# Patient Record
Sex: FEMALE | Race: WHITE | HISPANIC OR LATINO | Employment: FULL TIME | URBAN - METROPOLITAN AREA
[De-identification: names, ages, dates, MRNs, and addresses within clinical notes are randomized per-mention and may not be internally consistent; named-entity substitution may affect disease eponyms.]

---

## 2021-06-01 ENCOUNTER — OCCUPATIONAL MEDICINE (OUTPATIENT)
Dept: URGENT CARE | Facility: PHYSICIAN GROUP | Age: 26
End: 2021-06-01
Payer: COMMERCIAL

## 2021-06-01 VITALS
WEIGHT: 130 LBS | DIASTOLIC BLOOD PRESSURE: 64 MMHG | TEMPERATURE: 98 F | BODY MASS INDEX: 23.04 KG/M2 | HEIGHT: 63 IN | RESPIRATION RATE: 16 BRPM | SYSTOLIC BLOOD PRESSURE: 110 MMHG | HEART RATE: 66 BPM | OXYGEN SATURATION: 99 %

## 2021-06-01 DIAGNOSIS — R20.0 FINGER NUMBNESS: ICD-10-CM

## 2021-06-01 DIAGNOSIS — Z02.1 PRE-EMPLOYMENT DRUG SCREENING: ICD-10-CM

## 2021-06-01 DIAGNOSIS — S11.93XA: ICD-10-CM

## 2021-06-01 DIAGNOSIS — R29.898 HAND WEAKNESS: ICD-10-CM

## 2021-06-01 LAB
BREATH ALCOHOL COMMENT: NORMAL
POC BREATHALIZER: 0 PERCENT (ref 0–0.01)

## 2021-06-01 PROCEDURE — 82075 ASSAY OF BREATH ETHANOL: CPT | Performed by: NURSE PRACTITIONER

## 2021-06-01 PROCEDURE — 99000 SPECIMEN HANDLING OFFICE-LAB: CPT | Performed by: NURSE PRACTITIONER

## 2021-06-01 PROCEDURE — 36415 COLL VENOUS BLD VENIPUNCTURE: CPT | Performed by: NURSE PRACTITIONER

## 2021-06-01 PROCEDURE — 80305 DRUG TEST PRSMV DIR OPT OBS: CPT | Performed by: NURSE PRACTITIONER

## 2021-06-01 PROCEDURE — 99203 OFFICE O/P NEW LOW 30 MIN: CPT | Performed by: NURSE PRACTITIONER

## 2021-06-01 PROCEDURE — 80307 DRUG TEST PRSMV CHEM ANLYZR: CPT | Performed by: NURSE PRACTITIONER

## 2021-06-01 RX ORDER — METHYLPREDNISOLONE 4 MG/1
TABLET ORAL
Qty: 1 EACH | Refills: 0 | Status: SHIPPED | OUTPATIENT
Start: 2021-06-01 | End: 2021-06-01 | Stop reason: SDUPTHER

## 2021-06-01 RX ORDER — METHYLPREDNISOLONE 4 MG/1
TABLET ORAL
Qty: 1 EACH | Refills: 0 | Status: SHIPPED | OUTPATIENT
Start: 2021-06-01 | End: 2021-06-12

## 2021-06-01 ASSESSMENT — ENCOUNTER SYMPTOMS
CONSTITUTIONAL NEGATIVE: 1
CHILLS: 0
SENSORY CHANGE: 1
FEVER: 0

## 2021-06-01 ASSESSMENT — VISUAL ACUITY: OU: 1

## 2021-06-01 NOTE — LETTER
"EMPLOYEE’S CLAIM FOR COMPENSATION/ REPORT OF INITIAL TREATMENT  FORM C-4    EMPLOYEE’S CLAIM - PROVIDE ALL INFORMATION REQUESTED   First Name  Shahana Last Name  Deirdre Birthdate                    1995                Sex  female Claim Number   Home Address  [address] Age  25 y.o. 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 (if applicable)  I was working on the table    How did this injury or occupational disease occur? (Be specific an answer in detail. Use additional sheet if necessary)  I was building a truss at the table from the floor and the lead was working on top of the table with the staple gun and a staple flew in my neck going in about 1/4 inch into my skin   If you believe that you have an occupational disease, when did you first have knowledge of the disability and it relationship to your employment?  N/A Witnesses to the Accident  Becca Montesinos      Nature of Injury or Occupational Disease  Foreign Body  Part(s) of Body Injured or Affected  Soft Tissue - Neck, Hand (L), Hand (R)    I certify that the " above is true and correct to the best of my knowledge and that I have provided this information in order to obtain the benefits of [place]  I hereby authorize any physician, chiropractor, surgeon, practitioner, or other person, any hospital, including [facility] or [facility], any medical service organization, any insurance company, or other institution or organization to release to each other, any medical or other information, including benefits paid or payable, pertinent to this injury or disease, except information relative to diagnosis, treatment and/or counseling for AIDS, psychological conditions, alcohol or controlled substances, for which I must give specific authorization.  A Photostat of this authorization shall be as valid as the original.     Date 06/01/2021   Place [facility]   Employee’s Signature   THIS REPORT MUST BE COMPLETED AND MAILED WITHIN 3 WORKING DAYS OF TREATMENT   Place  [facility]  Name of Facility  [facility]   Date  6/1/2021 Diagnosis  (S11.93XA) Puncture wound of neck, initial encounter  (R20.0) Finger numbness  (R29.898) Hand weakness Is there evidence the injured employee was under the              influence of alcohol and/or another controlled substance at the time of accident?   Hour  10:43 AM Description of Injury or Disease  Diagnoses of Puncture wound of neck, initial encounter, Finger numbness, and Hand weakness were pertinent to this visit. No   Treatment  Initial visit. Suspect cubital tunnel syndrome. Rx sent for short course of steroids. Work restrictions. Follow up in 3 days. Return sooner if symptoms change/worsen.  Have you advised the patient to remain off work five days or     more? No   X-Ray Findings      If Yes   From Date  To Date      From information given by the employee, together with medical evidence, can you  "directly connect this injury or occupational disease as job incurred?  Yes If No Full Duty    No Modified Duty  Yes   Is additional medical care by a physician indicated?  Yes If Modified Duty, Specify any Limitations / Restrictions  Per D39   Do you know of any previous injury or disease contributing to this condition or occupational disease?                            No   Date  6/1/2021 Print Doctor’s Name   KAILYN Gross I certify the employer’s copy of  this form was mailed on:   Address  [address] [place] [address]. [address] Insurer’s Use Only     [facility] Zip  [address]    Provider’s Tax ID Number  [id number] Telephone  Dept: [phone]      sandrita-MAGGIE Young  Signature:     Degree          ORIGINAL-TREATING PHYSICIAN OR CHIROPRACTOR    PAGE 2-INSURER/TPA    PAGE 3-EMPLOYER    PAGE 4-EMPLOYEE        Form C-4 (rev.10/07)           BRIEF DESCRIPTION OF RIGHTS AND BENEFITS  (Pursuant to [place] 616C.050)    Notice of Injury or Occupational Disease (Incident Report Form C-1): If an injury or occupational disease (OD) arises out of and in the course of employment, you must provide written notice to your employer as soon as practicable, but no later than 7 days after the accident or OD. Your employer shall maintain a sufficient supply of the required forms.    Claim for Compensation (Form C-4): If medical treatment is sought, the form C-4 is available at the place of initial treatment. A completed \"Claim for Compensation\" (Form C-4) must be filed within 90 days after an accident or OD. The treating physician or chiropractor must, within 3 working days after treatment, complete and mail to the employer, the employer's insurer and third-party , the Claim for Compensation.    Medical Treatment: If you require medical treatment for your on-the-job injury or OD, you may be required to select a physician or chiropractor from a list provided by your workers’ compensation " insurer, if it has contracted with an Organization for Managed Care (MCO) or Preferred Provider Organization (PPO) or providers of health care. If your employer has not entered into a contract with an MCO or PPO, you may select a physician or chiropractor from the Panel of Physicians and Chiropractors. Any medical costs related to your industrial injury or OD will be paid by your insurer.    Temporary Total Disability (TTD): If your doctor has certified that you are unable to work for a period of at least 5 consecutive days, or 5 cumulative days in a 20-day period, or places restrictions on you that your employer does not accommodate, you may be entitled to TTD compensation.    Temporary Partial Disability (TPD): If the wage you receive upon reemployment is less than the compensation for TTD to which you are entitled, the insurer may be required to pay you TPD compensation to make up the difference. TPD can only be paid for a maximum of 24 months.    Permanent Partial Disability (PPD): When your medical condition is stable and there is an indication of a PPD as a result of your injury or OD, within 30 days, your insurer must arrange for an evaluation by a rating physician or chiropractor to determine the degree of your PPD. The amount of your PPD award depends on the date of injury, the results of the PPD evaluation, your age and wage.    Permanent Total Disability (PTD): If you are medically certified by a treating physician or chiropractor as permanently and totally disabled and have been granted a PTD status by your insurer, you are entitled to receive monthly benefits not to exceed 66 2/3% of your average monthly wage. The amount of your PTD payments is subject to reduction if you previously received a lump-sum PPD award.    Vocational Rehabilitation Services: You may be eligible for vocational rehabilitation services if you are unable to return to the job due to a permanent physical impairment or permanent  restrictions as a result of your injury or occupational disease.    Transportation and Per Shawn Reimbursement: You may be eligible for travel expenses and per shawn associated with medical treatment.    Reopening: You may be able to reopen your claim if your condition worsens after claim closure.     Appeal Process: If you disagree with a written determination issued by the insurer or the insurer does not respond to your request, you may appeal to the Department of Administration, , by following the instructions contained in your determination letter. You must appeal the determination within 70 days from the date of the determination letter at [address], [address], [place], [place] [address], or [address], [address], [place], [place] [address]. If you disagree with the  decision, you may appeal to the Department of Administration, . You must file your appeal within 30 days from the date of the  decision letter at [address], [address], [place], [place] [address], or [address], [address], [place], [place] [address]. If you disagree with a decision of an , you may file a petition for judicial review with the District Court. You must do so within 30 days of the Appeal Officer’s decision. You may be represented by an  at your own expense or you may contact the [facility] for possible representation.    [place]  for Injured Workers (NAIW): If you disagree with a  decision, you may request that NAIW represent you without charge at an  Hearing. For information regarding denial of benefits, you may contact the [facility] at: [address], [address] [address], [phone]) [phone], or [address], [address] [address], [phone]    To File a Complaint with the Division: If you wish to file a complaint with the  of the Division  of Industrial Relations (DIR),  please contact the Workers’ Compensation Section, [address], [address], [place], [place] [address], telephone (399) [phone], or [address], [address], [place], [place] [address], telephone [phone]) [phone].    For assistance with Workers’ Compensation Issues: You may contact the [facility] Office for Consumer Health Assistance, [address], [address], [place], [place] [address], Toll Free 1-413.167.4737, Web site: http://[facility].nv.gov/Programs/LIBRADO E-mail: librado@[facility].nv.gov              __________________________________________________________________                                    _________________            Employee Name / Signature                                                                                                                            Date                                                                                                                                                                                                                              D-2 (rev. 10/20)

## 2021-06-01 NOTE — LETTER
[facility]  [address] - [place] [address]  Phone:  [phone] - Fax:  [phone]   [facility] Progress Report and Disability Certification  Date of Service: 6/1/2021   No Show:  No  Date / Time of Next Visit: 6/4/2021 @9:20AM   Claim Information   Patient Name: Shahana Gilmore  Claim Number:     Employer: [employer]  Date of Injury: 5/30/2021     Insurer / TPA: [employer]  ID / SSN:     Occupation: Builder  Diagnosis: Diagnoses of Puncture wound of neck, initial encounter, Finger numbness, and Hand weakness were pertinent to this visit.    Medical Information   Related to Industrial Injury? Yes    Subjective Complaints:  DOI 5/30/2021 @ 2:00 PM.  Initial visit.  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  Reports that a staple flew into the right side of her neck.  Reports she was able to remove this completely.  Reports experiencing neck pain.  Was not moving her neck for a while.  Neck pain has improved.  Afterwards, started to notice some numbness at the tips of her last 4 fingers and on both sides.  Feels weak.  Denies hand pain.  Has been back to work, but notes that it is difficult to  the hammer due to the numbness and weakness in her hands.  Right hand dominant.  Patient reports her last tetanus shot was received in 2013.  Denies fever.  Denies bowel/bladder incontinence or sensory changes/weakness elsewhere.  Denies previous injury or similar symptoms.  Denies second job.  Has worked her current job for 2 years which involves repetitive use of her hands.   Objective Findings: Constitutional:       General: She is not in acute distress.     Appearance: She is well-developed. She is not ill-appearing or toxic-appearing.   Musculoskeletal:         General: No deformity. Normal range of motion.      Right hand: No  swelling, deformity, lacerations or tenderness. Normal range of motion. Decreased strength. Decreased sensation of the ulnar distribution and median distribution. Normal capillary refill.      Left hand: No swelling, deformity, lacerations or tenderness. Normal range of motion. Decreased strength. Decreased sensation of the ulnar distribution and median distribution. Normal capillary refill.      Cervical back: Normal range of motion. Signs of trauma (Two small, healing puncture wounds from staple at right lateral aspect of neck, minimal swelling, TTP, no surrounding erythema, edema, warmth, or discharge) present. No swelling, deformity, erythema, tenderness or bony tenderness. Normal range of motion.      Comments: Positive left Tinel's sign; negative Phalen sign   Skin:     General: Skin is warm and dry.      Capillary Refill: Capillary refill takes less than 2 seconds.      Coloration: Skin is not pale.      Findings: No bruising.   Neurological:      Mental Status: She is alert and oriented to person, place, and time.      GCS: GCS eye subscore is 4. GCS verbal subscore is 5. GCS motor subscore is 6.      Coordination: Coordination normal.   Pre-Existing Condition(s):     Assessment:   Initial Visit    Status: Additional Care Required  Permanent Disability:No    Plan: Medication    Diagnostics:      Comments:  Initial visit. Puncture wound of neck healing well. Suspect cubital/carpal tunnel syndrome. Rx sent for short course of steroids. Work restrictions. Follow up in 3 days. Return sooner if symptoms change/worsen.    Disability Information   Status: Released to Restricted Duty    From:  6/1/2021  Through: 6/4/2021 Restrictions are: Temporary   Physical Restrictions   Sitting:    Standing:    Stooping:    Bending:      Squatting:    Walking:    Climbing:    Pushing:      Pulling:    Other:    Reaching Above Shoulder (L):   Reaching Above Shoulder (R):       Reaching Below Shoulder (L):    Reaching Below  Shoulder (R):      Not to exceed Weight Limits   Carrying(hrs):   Weight Limit(lb):   Lifting(hrs):   Weight  Limit(lb):     Comments:      Repetitive Actions   Hands: i.e. Fine Manipulations from Grasping: < or = to 1 hr/day   Feet: i.e. Operating Foot Controls:     Driving / Operate Machinery:     Provider Name:   KAILYN Gross Physician Signature:  Physician Name:     Clinic Name / Location: [address]  [place] [address] Clinic Phone Number: Dept: [phone]   Appointment Time: 10:00 Am Visit Start Time: 10:43 AM   Check-In Time:  10:18 Am Visit Discharge Time: 11:50 AM   Original-Treating Physician or Chiropractor    Page 2-Insurer/TPA    Page 3-Employer    Page 4-Employee

## 2021-06-01 NOTE — LETTER
[facility] [place] [facility]  [address] - [place] [address]  Phone:  [phone] - Fax:  [phone]   [facility] Progress Report and Disability Certification  Date of Service: 6/1/2021   No Show:  No  Date / Time of Next Visit: 6/4/2021   Claim Information   Patient Name: Shahana Gilmore  Claim Number:     Employer: [employer] *** Date of Injury: 5/30/2021     Insurer / TPA: [employer] *** ID / SSN:     Occupation: Builder *** Diagnosis: Diagnoses of Puncture wound of neck, initial encounter, Finger numbness, and Hand weakness were pertinent to this visit.    Medical Information   Related to Industrial Injury? Yes ***   Subjective Complaints:  DOI 5/30/2021 @ 2:00 PM.  Initial visit.  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  Reports that a staple flew into the right side of her neck.  Reports she was able to remove this completely.  Reports experiencing neck pain.  Was not moving her neck for a while.  Neck pain has improved.  Afterwards, started to notice some numbness at the tips of her last 4 fingers and on both sides.  Feels weak.  Denies hand pain.  Has been back to work, but notes that it is difficult to  the hammer due to the numbness and weakness in her hands.  Right hand dominant.  Patient reports her last tetanus shot was received in 2013.  Denies fever.  Denies bowel/bladder incontinence or sensory changes/weakness elsewhere.  Denies previous injury or similar symptoms.  Denies second job.  Has worked her current job for 2 years which involves repetitive use of her hands.   Objective Findings: Constitutional:       General: She is not in acute distress.     Appearance: She is well-developed. She is not ill-appearing or toxic-appearing.   Musculoskeletal:         General: No deformity. Normal range of motion.      Right hand: No  swelling, deformity, lacerations or tenderness. Normal range of motion. Decreased strength. Decreased sensation of the ulnar distribution and median distribution. Normal capillary refill.      Left hand: No swelling, deformity, lacerations or tenderness. Normal range of motion. Decreased strength. Decreased sensation of the ulnar distribution and median distribution. Normal capillary refill.      Cervical back: Normal range of motion. Signs of trauma (Two small, healing puncture wounds from staple at right lateral aspect of neck, minimal swelling, TTP, no surrounding erythema, edema, warmth, or discharge) present. No swelling, deformity, erythema, tenderness or bony tenderness. Normal range of motion.      Comments: Positive left Tinel's sign; negative Phalen sign   Skin:     General: Skin is warm and dry.      Capillary Refill: Capillary refill takes less than 2 seconds.      Coloration: Skin is not pale.      Findings: No bruising.   Neurological:      Mental Status: She is alert and oriented to person, place, and time.      GCS: GCS eye subscore is 4. GCS verbal subscore is 5. GCS motor subscore is 6.      Coordination: Coordination normal.   Pre-Existing Condition(s):     Assessment:   Initial Visit    Status: Additional Care Required  Permanent Disability:No    Plan: Medication    Diagnostics:      Comments:  Initial visit. Suspect cubital/carpal tunnel syndrome. Rx sent for short course of steroids. Work restrictions. Follow up in 3 days. Return sooner if symptoms change/worsen.    Disability Information   Status: Released to Restricted Duty    From:  6/1/2021  Through: 6/4/2021 Restrictions are: Temporary   Physical Restrictions   Sitting:    Standing:    Stooping:    Bending:      Squatting:    Walking:    Climbing:    Pushing:      Pulling:    Other:    Reaching Above Shoulder (L):   Reaching Above Shoulder (R):       Reaching Below Shoulder (L):    Reaching Below Shoulder (R):      Not to exceed Weight  Limits   Carrying(hrs):   Weight Limit(lb):   Lifting(hrs):   Weight  Limit(lb):     Comments:      Repetitive Actions   Hands: i.e. Fine Manipulations from Grasping: < or = to 1 hr/day   Feet: i.e. Operating Foot Controls:     Driving / Operate Machinery:     Provider Name:   KAILYN Gross Physician Signature:  Physician Name:     Clinic Name / Location: [address]  [place] [address] Clinic Phone Number: Dept: [phone]   Appointment Time: 10:00 Am Visit Start Time: 10:43 AM   Check-In Time:  10:18 Am Visit Discharge Time:  ***   Original-Treating Physician or Chiropractor    Page 2-Insurer/TPA    Page 3-Employer    Page 4-Employee

## 2021-06-01 NOTE — PROGRESS NOTES
Subjective:     Shahana Gilmore is a 25 y.o. female who presents for Neck Injury (WC, staple went in R side neck, (stape is out), fingers are numb, x4 days )    DOI 5/30/2021 @ 2:00 PM.  Initial visit.  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    Patient reports she was building a [employer] while the lead was working on top with a staple gun.  Reports that a staple flew into the right side of her neck.  Reports she was able to remove this completely.  Reports experiencing neck pain.  Was not moving her neck for a while.  Neck pain has improved.  Afterwards, started to notice some numbness at the tips of her last 4 fingers and on both sides.  Feels weak.  Denies hand pain.  Has been back to work, but notes that it is difficult to  the hammer due to the numbness and weakness in her hands.  Right hand dominant.  Patient reports her last tetanus shot was received in 2013.  Denies fever.  Denies bowel/bladder incontinence or sensory changes/weakness elsewhere.  Denies previous injury or similar symptoms.  Denies second job.  Has worked her current job for 2 years which involves repetitive use of her hands.    Patient was screened prior to rooming and denied COVID-19 diagnosis or contact with a person who has been diagnosed or is suspected to have COVID-19. During this visit, appropriate PPE was worn, hand hygiene was performed, and the patient and any visitors were masked.    PMH: No pertinent past medical history to this problem  MEDS: Medications were reviewed in Epic  ALLERGIES: Allergies were reviewed in Epic  SOCHX: Works as a builder at [employer]   FH: No pertinent family history to this problem    Review of Systems   Constitutional: Negative.  Negative for chills, fever and malaise/fatigue.   Skin:        R neck staple puncture wound   Neurological: Positive for sensory change (Finger numbness, weakness).   All other systems reviewed and are negative.    Additional details per HPI.      Objective:     /64  "(BP Location: Right arm, Patient Position: Sitting, BP Cuff Size: Adult)   Pulse 66   Temp 36.7 °C (98 °F) (Temporal)   Resp 16   Ht 1.6 m (5' 3\")   Wt 59 kg (130 lb)   SpO2 99%   BMI 23.03 kg/m²     Physical Exam  Vitals reviewed.   Constitutional:       General: She is not in acute distress.     Appearance: She is well-developed. She is not ill-appearing or toxic-appearing.   HENT:      Head: Normocephalic.      Right Ear: External ear normal.      Left Ear: External ear normal.      Nose: Nose normal.   Eyes:      General: Vision grossly intact.      Extraocular Movements: Extraocular movements intact.      Conjunctiva/sclera: Conjunctivae normal.   Cardiovascular:      Rate and Rhythm: Normal rate.      Pulses: Normal pulses.   Pulmonary:      Effort: Pulmonary effort is normal. No respiratory distress.   Musculoskeletal:         General: No deformity. Normal range of motion.      Right hand: No swelling, deformity, lacerations or tenderness. Normal range of motion. Decreased strength. Decreased sensation of the ulnar distribution and median distribution. Normal capillary refill.      Left hand: No swelling, deformity, lacerations or tenderness. Normal range of motion. Decreased strength. Decreased sensation of the ulnar distribution and median distribution. Normal capillary refill.      Cervical back: Normal range of motion. Signs of trauma (Two small, healing puncture wounds from staple at right lateral aspect of neck, minimal swelling, TTP, no surrounding erythema, edema, warmth, or discharge) present. No swelling, deformity, erythema, tenderness or bony tenderness. Normal range of motion.      Comments: Positive left Tinel's sign; negative Phalen sign   Skin:     General: Skin is warm and dry.      Capillary Refill: Capillary refill takes less than 2 seconds.      Coloration: Skin is not pale.      Findings: No bruising.   Neurological:      Mental Status: She is alert and oriented to person, place, " and time.      GCS: GCS eye subscore is 4. GCS verbal subscore is 5. GCS motor subscore is 6.      Motor: No weakness.      Coordination: Coordination normal.   Psychiatric:         Behavior: Behavior normal. Behavior is cooperative.       Assessment/Plan:     1. Puncture wound of neck, initial encounter    2. Finger numbness  - methylPREDNISolone (MEDROL DOSEPAK) 4 MG Tablet Therapy Pack; Use as directed on package. May take all of Day 1 as a single dose (24 mg) when starting.  Dispense: 1 Each; Refill: 0    3. Hand weakness  - methylPREDNISolone (MEDROL DOSEPAK) 4 MG Tablet Therapy Pack; Use as directed on package. May take all of Day 1 as a single dose (24 mg) when starting.  Dispense: 1 Each; Refill: 0    Initial visit. Puncture wound healing well.  Suspect cubital/carpal tunnel syndrome. Rx sent for short course of steroids. Work restrictions. Follow up in 3 days. Return sooner if symptoms change/worsen.    Differential diagnosis, natural history, supportive care, over-the-counter symptom management per 's instructions, close monitoring, and indications for immediate follow-up discussed.     Patient advised to: Return in about 3 days (around 6/4/2021).    All questions answered. Patient agrees with the plan of care.

## 2021-06-01 NOTE — LETTER
[facility]  [address] - [place] [address]  Phone:  [phone] - Fax:  [phone]   [facility] Progress Report and Disability Certification  Date of Service: 6/1/2021   No Show:  No  Date / Time of Next Visit: 6/4/2021   Claim Information   Patient Name: Shahana Gilmore  Claim Number:     Employer: [employer]  Date of Injury: 5/30/2021     Insurer / TPA: [employer]  ID / SSN:     Occupation: Builder  Diagnosis: Diagnoses of Puncture wound of neck, initial encounter, Finger numbness, and Hand weakness were pertinent to this visit.    Medical Information   Related to Industrial Injury? Yes    Subjective Complaints:  DOI 5/30/2021 @ 2:00 PM.  Initial visit.  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  Reports that a staple flew into the right side of her neck.  Reports she was able to remove this completely.  Reports experiencing neck pain.  Was not moving her neck for a while.  Neck pain has improved.  Afterwards, started to notice some numbness at the tips of her last 4 fingers and on both sides.  Feels weak.  Denies hand pain.  Has been back to work, but notes that it is difficult to  the hammer due to the numbness and weakness in her hands.  Right hand dominant.  Patient reports her last tetanus shot was received in 2013.  Denies fever.  Denies bowel/bladder incontinence or sensory changes/weakness elsewhere.  Denies previous injury or similar symptoms.  Denies second job.  Has worked her current job for 2 years which involves repetitive use of her hands.   Objective Findings: Constitutional:       General: She is not in acute distress.     Appearance: She is well-developed. She is not ill-appearing or toxic-appearing.   Musculoskeletal:         General: No deformity. Normal range of motion.      Right hand: No swelling,  deformity, lacerations or tenderness. Normal range of motion. Decreased strength. Decreased sensation of the ulnar distribution. Normal capillary refill.      Left hand: No swelling, deformity, lacerations or tenderness. Normal range of motion. Decreased strength. Decreased sensation of the ulnar distribution. Normal capillary refill.      Cervical back: Normal range of motion. Signs of trauma (Two small, healing puncture wounds from staple at right lateral aspect of neck, minimal swelling, TTP, no surrounding erythema, edema, warmth, or discharge) present. No swelling, deformity, erythema, tenderness or bony tenderness. Normal range of motion.      Comments: Positive left Tinel's sign; negative Phalen sign   Skin:     General: Skin is warm and dry.      Capillary Refill: Capillary refill takes less than 2 seconds.      Coloration: Skin is not pale.      Findings: No bruising.   Neurological:      Mental Status: She is alert and oriented to person, place, and time.      GCS: GCS eye subscore is 4. GCS verbal subscore is 5. GCS motor subscore is 6.      Coordination: Coordination normal.   Psychiatric:         Behavior: Behavior normal. Behavior is cooperative.    Pre-Existing Condition(s):     Assessment:   Initial Visit    Status: Additional Care Required  Permanent Disability:No    Plan: Medication    Diagnostics:      Comments:  Initial visit. Suspect cubital tunnel syndrome. Rx sent for short course of steroids. Work restrictions. Follow up in 3 days. Return sooner if symptoms change/worsen.    Disability Information   Status: Released to Restricted Duty    From:  6/1/2021  Through: 6/4/2021 Restrictions are: Temporary   Physical Restrictions   Sitting:    Standing:    Stooping:    Bending:      Squatting:    Walking:    Climbing:    Pushing:      Pulling:    Other:    Reaching Above Shoulder (L):   Reaching Above Shoulder (R):       Reaching Below Shoulder (L):    Reaching Below Shoulder (R):      Not to exceed  Weight Limits   Carrying(hrs):   Weight Limit(lb):   Lifting(hrs):   Weight  Limit(lb):     Comments:      Repetitive Actions   Hands: i.e. Fine Manipulations from Grasping: < or = to 1 hr/day   Feet: i.e. Operating Foot Controls:     Driving / Operate Machinery:     Provider Name:   KAILYN Gross Physician Signature:  Physician Name:     Clinic Name / Location: [address]  [place] [address] Clinic Phone Number: Dept: [phone]   Appointment Time: 10:00 Am Visit Start Time: 10:43 AM   Check-In Time:  10:18 Am Visit Discharge Time: 11:49 AM   Original-Treating Physician or Chiropractor    Page 2-Insurer/TPA    Page 3-Employer    Page 4-Employee

## 2021-06-04 ENCOUNTER — OCCUPATIONAL MEDICINE (OUTPATIENT)
Dept: URGENT CARE | Facility: PHYSICIAN GROUP | Age: 26
End: 2021-06-04
Payer: COMMERCIAL

## 2021-06-04 VITALS
OXYGEN SATURATION: 99 % | SYSTOLIC BLOOD PRESSURE: 100 MMHG | DIASTOLIC BLOOD PRESSURE: 68 MMHG | RESPIRATION RATE: 18 BRPM | HEIGHT: 63 IN | BODY MASS INDEX: 23.04 KG/M2 | WEIGHT: 130 LBS | HEART RATE: 60 BPM | TEMPERATURE: 97.7 F

## 2021-06-04 DIAGNOSIS — R29.898 HAND WEAKNESS: ICD-10-CM

## 2021-06-04 DIAGNOSIS — S11.93XA: ICD-10-CM

## 2021-06-04 PROCEDURE — 99213 OFFICE O/P EST LOW 20 MIN: CPT | Performed by: FAMILY MEDICINE

## 2021-06-04 NOTE — LETTER
[facility]  [address] [place] [address]. [address] - [place] [address]  Phone:  [phone] - Fax:  [phone]   [facility] Progress Report and Disability Certification  Date of Service: 6/4/2021   No Show:  No  Date / Time of Next Visit: 6/7/2021 @ 8:00 AM   Claim Information   Patient Name: Shahana Gilmore  Claim Number:     Employer: [employer]  Date of Injury: 5/30/2021     Insurer / TPA: [employer]  ID / SSN:     Occupation: Builder  Diagnosis: Diagnoses of Puncture wound of neck, initial encounter and Hand weakness were pertinent to this visit.    Medical Information   Related to Industrial Injury? Yes    Subjective Complaints:   DOI:  5/30    Status post puncture wound to rt side of neck.  States wounds are healing well.        She also noted decreased strength and numbness over digits 2-5  in both hands.   She was seen on 6/1 and started on medrol.    Reports numbness and tingling improved with medrol.          Denies fever, neck pain or discharge.       Objective Findings: Left hand:   Full AROM.   Strength 5/5.   Sensation intact.  No thenar wasting.   Negative tinel's and phlens signs.  Normal cap refill  Rt hand- Full AROM.   Strength 5/5.   Sensation intact.  No thenar wasting.   Negative tinel's and phlens signs.    normal capillary refill.            Anterior neck:  Puncture wounds are healed.   No signs of wound infection.      Pre-Existing Condition(s):     Assessment:   Condition Improved    Status: Additional Care Required  Permanent Disability:No    Plan:      Diagnostics:      Comments:       Disability Information   Status:      From:  6/4/2021  Through: 6/7/2021 Restrictions are: Temporary   Physical Restrictions   Sitting:    Standing:    Stooping:    Bending:      Squatting:    Walking:    Climbing:    Pushing:      Pulling:    Other:    Reaching Above Shoulder (L):   Reaching Above Shoulder (R):       Reaching Below Shoulder  (L):    Reaching Below Shoulder (R):      Not to exceed Weight Limits   Carrying(hrs):   Weight Limit(lb): < or = to 10 pounds Lifting(hrs):   Weight  Limit(lb): < or = to 10 pounds   Comments: Left hand:   Full AROM.   Strength 5/5.   Sensation intact.  No thenar wasting.   Negative tinel's and phlens signs.  Normal cap refill     1. Puncture wound of neck, initial encounter  resolved    2. Hand weakness  Improved on medrol  Continue medrol  Bilateral wrist splints given and advised to wear full time         Repetitive Actions   Hands: i.e. Fine Manipulations from Grasping: < or = to 1 hr/day   Feet: i.e. Operating Foot Controls:     Driving / Operate Machinery:     Provider Name:   Manoj Archibald M.D. Physician Signature:  Physician Name:     Clinic Name / Location: [address]  [place] [address] Clinic Phone Number: Dept: [phone]   Appointment Time: 9:20 Am Visit Start Time: 9:34 AM   Check-In Time:  9:29 Am Visit Discharge Time: 10:15 am   Original-Treating Physician or Chiropractor    Page 2-Insurer/TPA    Page 3-Employer    Page 4-Employee

## 2021-06-04 NOTE — PROGRESS NOTES
"Subjective:      Chief Complaint   Patient presents with   • Work-Related Injury     DOI 5/30/21, staple in neck. neck is not hurting but her hands are numb. the medication is making her dizzy. she missed work due to that. but the medication does help.             DOI:  5/30    Status post puncture wound to rt side of neck.  States wounds are healing well.        She also noted decreased strength and numbness over digits 2-5  in both hands.   She was seen on 6/1 and started on medrol.    Reports numbness and tingling improved with medrol.          Denies fever, neck pain or discharge.               Social History     Tobacco Use   • Smoking status: Never Smoker   • Smokeless tobacco: Never Used   Substance Use Topics   • Alcohol use: Not Currently   • Drug use: Never           No past medical history on file.      Current Outpatient Medications on File Prior to Visit   Medication Sig Dispense Refill   • methylPREDNISolone (MEDROL DOSEPAK) 4 MG Tablet Therapy Pack Use as directed on package. May take all of Day 1 as a single dose (24 mg) when starting. 1 Each 0     No current facility-administered medications on file prior to visit.         Review of Systems   Cardio - denies chest pain  resp - denies SOB.   Neurological: Negative for tingling, sensory change and focal weakness.   All other systems reviewed and are negative.         Objective:     /68   Pulse 60   Temp 36.5 °C (97.7 °F) (Temporal)   Resp 18   Ht 1.6 m (5' 3\")   Wt 59 kg (130 lb)   SpO2 99%       Physical Exam   Constitutional: pt is oriented to person, place, and time. Pt appears well-developed. No distress.   HENT:   Head: Normocephalic and atraumatic.   Eyes: Conjunctivae are normal.   Cardiovascular: Normal rate.    Pulmonary/Chest: Effort normal.   Musculoskeletal:   Left hand:   Full AROM.   Strength 5/5.   Sensation intact.  No thenar wasting.   Negative tinel's and phlens signs.  Normal cap refill  Rt hand- Full AROM.   Strength " 5/5.   Sensation intact.  No thenar wasting.   Negative tinel's and phlens signs.    normal capillary refill.            Anterior neck:  Puncture wounds are healed.   No signs of wound infection.       Neurological: pt is alert and oriented to person, place, and time.   Skin: Skin is warm. Pt is not diaphoretic. No erythema.   Psychiatric:  behavior is normal.   Nursing note and vitals reviewed.              Assessment/Plan:          1. Puncture wound of neck, initial encounter  resolved    2. Hand weakness  Improved on medrol  Continue medrol  Bilateral wrist splints given and advised to wear full time  Restrictions per D39  F/u 6/7

## 2021-06-04 NOTE — LETTER
[facility]  [address] - [place] [address]  Phone:  [phone] - Fax:  [phone]   [facility] Progress Report and Disability Certification  Date of Service: 6/4/2021   No Show:  No  Date / Time of Next Visit: 6/7/2021   Claim Information   Patient Name: Shahana Gilmore  Claim Number:     Employer: [employer] *** Date of Injury: 5/30/2021     Insurer / TPA: [employer] *** ID / SSN:     Occupation: Builder *** Diagnosis: Diagnoses of Puncture wound of neck, initial encounter and Hand weakness were pertinent to this visit.    Medical Information   Related to Industrial Injury? Yes ***   Subjective Complaints:   DOI:  5/30    Status post puncture wound to rt side of neck.  States wounds are healing well.        She also noted decreased strength and numbness over digits 2-5  in both hands.   She was seen on 6/1 and started on medrol.    Reports numbness and tingling improved with medrol.          Denies fever, neck pain or discharge.       Objective Findings: Left hand:   Full AROM.   Strength 5/5.   Sensation intact.  No thenar wasting.   Negative tinel's and phlens signs.  Normal cap refill  Rt hand- Full AROM.   Strength 5/5.   Sensation intact.  No thenar wasting.   Negative tinel's and phlens signs.    normal capillary refill.            Anterior neck:  Puncture wounds are healed.   No signs of wound infection.      Pre-Existing Condition(s):     Assessment:   Condition Improved    Status: Additional Care Required  Permanent Disability:No    Plan:      Diagnostics:      Comments:       Disability Information   Status:      From:  6/4/2021  Through: 6/7/2021 Restrictions are: Temporary   Physical Restrictions   Sitting:    Standing:    Stooping:    Bending:      Squatting:    Walking:    Climbing:    Pushing:      Pulling:    Other:    Reaching Above Shoulder (L):   Reaching Above Shoulder (R):       Reaching Below Shoulder  (L):    Reaching Below Shoulder (R):      Not to exceed Weight Limits   Carrying(hrs):   Weight Limit(lb): < or = to 10 pounds Lifting(hrs):   Weight  Limit(lb): < or = to 10 pounds   Comments: Left hand:   Full AROM.   Strength 5/5.   Sensation intact.  No thenar wasting.   Negative tinel's and phlens signs.  Normal cap refill  Rt hand- Full AROM.   Strength 5/5.   Sensation intact.  No thenar wasting.   Negative tinel's and phlens signs.    normal capillary refill.            Anterior neck:  Puncture wounds are healed.   No signs of wound infection.       Repetitive Actions   Hands: i.e. Fine Manipulations from Grasping: < or = to 1 hr/day   Feet: i.e. Operating Foot Controls:     Driving / Operate Machinery:     Provider Name:   Manoj Archibald M.D. Physician Signature:  Physician Name:     Clinic Name / Location: [address]  [place] [address] Clinic Phone Number: Dept: [phone]   Appointment Time: 9:20 Am Visit Start Time: 9:34 AM   Check-In Time:  9:29 Am Visit Discharge Time:  ***   Original-Treating Physician or Chiropractor    Page 2-Insurer/TPA    Page 3-Employer    Page 4-Employee

## 2021-06-04 NOTE — LETTER
[facility]  [address] [place] [address]. [address] - [place] [address]  Phone:  [phone] - Fax:  [phone]   [facility] Progress Report and Disability Certification  Date of Service: 6/4/2021   No Show:  No  Date / Time of Next Visit: 6/7/2021   Claim Information   Patient Name: Shahana Gilmore  Claim Number:     Employer: [employer]  Date of Injury: 5/30/2021     Insurer / TPA: [employer]  ID / SSN:     Occupation: Builder  Diagnosis: Diagnoses of Puncture wound of neck, initial encounter and Hand weakness were pertinent to this visit.    Medical Information   Related to Industrial Injury? Yes    Subjective Complaints:   DOI:  5/30    Status post puncture wound to rt side of neck.  States wounds are healing well.        She also noted decreased strength and numbness over digits 2-5  in both hands.   She was seen on 6/1 and started on medrol.    Reports numbness and tingling improved with medrol.          Denies fever, neck pain or discharge.       Objective Findings: Left hand:   Full AROM.   Strength 5/5.   Sensation intact.  No thenar wasting.   Negative tinel's and phlens signs.  Normal cap refill  Rt hand- Full AROM.   Strength 5/5.   Sensation intact.  No thenar wasting.   Negative tinel's and phlens signs.    normal capillary refill.            Anterior neck:  Puncture wounds are healed.   No signs of wound infection.      Pre-Existing Condition(s):     Assessment:   Condition Improved    Status: Additional Care Required  Permanent Disability:No    Plan:      Diagnostics:      Comments:       Disability Information   Status:      From:  6/4/2021  Through: 6/7/2021 Restrictions are: Temporary   Physical Restrictions   Sitting:    Standing:    Stooping:    Bending:      Squatting:    Walking:    Climbing:    Pushing:      Pulling:    Other:    Reaching Above Shoulder (L):   Reaching Above Shoulder (R):       Reaching Below Shoulder (L):     Reaching Below Shoulder (R):      Not to exceed Weight Limits   Carrying(hrs):   Weight Limit(lb): < or = to 10 pounds Lifting(hrs):   Weight  Limit(lb): < or = to 10 pounds   Comments:    1. Puncture wound of neck, initial encounter  resolved    2. Hand weakness  Improved on medrol  Continue medrol  Bilateral wrist splints given and advised to wear full time  Restrictions per D39  F/u 6/7    Repetitive Actions   Hands: i.e. Fine Manipulations from Grasping: < or = to 1 hr/day   Feet: i.e. Operating Foot Controls:     Driving / Operate Machinery:     Provider Name:   Manoj Archibald M.D. Physician Signature:  Physician Name:     Clinic Name / Location: [address]  [place] [address] Clinic Phone Number: Dept: [phone]   Appointment Time: 9:20 Am Visit Start Time: 9:34 AM   Check-In Time:  9:29 Am Visit Discharge Time:     Original-Treating Physician or Chiropractor    Page 2-Insurer/TPA    Page 3-Employer    Page 4-Employee

## 2021-06-04 NOTE — LETTER
[facility]  [address] [place] [address]. 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    Medical Information   Related to Industrial Injury? Yes    Subjective Complaints:   DOI:  5/30    Status post puncture wound to rt side of neck.  States wounds are healing well.        She also noted decreased strength and numbness over digits 2-5  in both hands.   She was seen on 6/1 and started on medrol.    Reports numbness and tingling improved with medrol.          Denies fever, neck pain or discharge.       Objective Findings: Left hand:   Full AROM.   Strength 5/5.   Sensation intact.  No thenar wasting.   Negative tinel's and phlens signs.  Normal cap refill  Rt hand- Full AROM.   Strength 5/5.   Sensation intact.  No thenar wasting.   Negative tinel's and phlens signs.    normal capillary refill.            Anterior neck:  Puncture wounds are healed.   No signs of wound infection.      Pre-Existing Condition(s):     Assessment:   Condition Improved    Status: Additional Care Required  Permanent Disability:No    Plan:      Diagnostics:      Comments:       Disability Information   Status:      From:  6/4/2021  Through: 6/7/2021 Restrictions are: Temporary   Physical Restrictions   Sitting:    Standing:    Stooping:    Bending:      Squatting:    Walking:    Climbing:    Pushing:      Pulling:    Other:    Reaching Above Shoulder (L):   Reaching Above Shoulder (R):       Reaching Below Shoulder (L):     Reaching Below Shoulder (R):      Not to exceed Weight Limits   Carrying(hrs):   Weight Limit(lb): < or = to 10 pounds Lifting(hrs):   Weight  Limit(lb): < or = to 10 pounds   Comments:    1. Puncture wound of neck, initial encounter  resolved    2. Hand weakness  Improved on medrol  Continue medrol  Bilateral wrist splints given and advised to wear full time  Restrictions per D39  F/u 6/7    Repetitive Actions   Hands: i.e. Fine Manipulations from Grasping: < or = to 1 hr/day   Feet: i.e. Operating Foot Controls:     Driving / Operate Machinery:     Provider Name:   Manoj Archibald M.D. Physician Signature:  Physician Name:     Clinic Name / Location: [address]  [place] [address] Clinic Phone Number: Dept: [phone]   Appointment Time: 9:20 Am Visit Start Time: 9:34 AM   Check-In Time:  9:29 Am Visit Discharge Time:     Original-Treating Physician or Chiropractor    Page 2-Insurer/TPA    Page 3-Employer    Page 4-Employee

## 2021-06-07 ENCOUNTER — OCCUPATIONAL MEDICINE (OUTPATIENT)
Dept: URGENT CARE | Facility: PHYSICIAN GROUP | Age: 26
End: 2021-06-07
Payer: COMMERCIAL

## 2021-06-07 VITALS
RESPIRATION RATE: 16 BRPM | HEART RATE: 66 BPM | OXYGEN SATURATION: 98 % | HEIGHT: 63 IN | WEIGHT: 130 LBS | DIASTOLIC BLOOD PRESSURE: 66 MMHG | BODY MASS INDEX: 23.04 KG/M2 | TEMPERATURE: 97.4 F | SYSTOLIC BLOOD PRESSURE: 116 MMHG

## 2021-06-07 DIAGNOSIS — S11.93XD: ICD-10-CM

## 2021-06-07 DIAGNOSIS — R20.0 FINGER NUMBNESS: ICD-10-CM

## 2021-06-07 PROCEDURE — 99213 OFFICE O/P EST LOW 20 MIN: CPT | Performed by: NURSE PRACTITIONER

## 2021-06-07 ASSESSMENT — ENCOUNTER SYMPTOMS: NECK PAIN: 0

## 2021-06-07 NOTE — LETTER
[facility]  [address] - [place] [address]  Phone:  [phone] - Fax:  [phone]   [facility] Progress Report and Disability Certification  Date of Service: 6/7/2021   No Show:  No  Date / Time of Next Visit: 6/12/2021 10:00am   Claim Information   Patient Name: Shahana Gilmore  Claim Number:     Employer: [employer]  Date of Injury: 5/30/2021     Insurer / TPA: [employer]  ID / SSN:     Occupation: Builder  Diagnosis: Diagnoses of Puncture wound of neck, subsequent encounter and Finger numbness were pertinent to this visit.    Medical Information   Related to Industrial Injury? Yes    Subjective Complaints:  DOI:  5/30   Status post puncture wound to rt side of neck.  Wounds have healed. Was given medrol on 6/1,  reports numbness and tingling improved with medrol.      Endorses very mild numbness to distal fingertips digits 2-4  in right hand.       Denies fever, neck pain or discharge. Would like to return to work today.   Objective Findings: Rt hand- Full AROM.   Strength 5/5.   Sensation intact.  No thenar wasting. normal capillary refill.            Anterior neck:  Puncture wounds are healed.  FROM neck in all planes.   Pre-Existing Condition(s):     Assessment:   Condition Improved    Status: Additional Care Required  Permanent Disability:No    Plan:      Diagnostics:      Comments:  Advance work restrictions, trial full duty  RTC in 5 days, anticipate discharge MMI    Disability Information   Status: Released to Full Duty    From:  6/7/2021  Through: 6/12/2021 Restrictions are:     Physical Restrictions   Sitting:    Standing:    Stooping:    Bending:      Squatting:    Walking:    Climbing:    Pushing:      Pulling:    Other:    Reaching Above Shoulder (L):   Reaching Above Shoulder (R):       Reaching Below Shoulder (L):    Reaching Below Shoulder (R):      Not to exceed Weight Limits   Carrying(hrs):   Weight  Limit(lb):   Lifting(hrs):   Weight  Limit(lb):     Comments:      Repetitive Actions   Hands: i.e. Fine Manipulations from Grasping:     Feet: i.e. Operating Foot Controls:     Driving / Operate Machinery:     Provider Name:   KAILYN Santana Physician Signature:  Physician Name:     Clinic Name / Location: [address]  [place] [address] Clinic Phone Number: Dept: [phone]   Appointment Time: 8:00 Am Visit Start Time: 8:10 AM   Check-In Time:  7:59 Am Visit Discharge Time:  8:46AM   Original-Treating Physician or Chiropractor    Page 2-Insurer/TPA    Page 3-Employer    Page 4-Employee

## 2021-06-07 NOTE — PROGRESS NOTES
"Subjective:      Shahana Gilmore is a 25 y.o. female who presents with Neck Pain ([facility], neck puncture wound, pt feels better no pain )      DOI:  5/30   Status post puncture wound to rt side of neck.  Wounds have healed. Was given medrol on 6/1,  reports numbness and tingling improved with medrol.      Endorses very mild numbness to distal fingertips digits 2-4  in right hand.       Denies fever, neck pain or discharge. Would like to return to work today.     HPI    Review of Systems   Musculoskeletal: Negative for neck pain (resolved).   All other systems reviewed and are negative.    PMH: No pertinent past medical history to this problem  MEDS: Medications were reviewed in Epic  ALLERGIES: Allergies were reviewed in Epic  SOCHX: Works as a builder at [employer]   FH: No pertinent family history to this problem         Objective:     /66 (BP Location: Right arm, Patient Position: Sitting, BP Cuff Size: Adult)   Pulse 66   Temp 36.3 °C (97.4 °F) (Temporal)   Resp 16   Ht 1.6 m (5' 3\")   Wt 59 kg (130 lb)   SpO2 98%   BMI 23.03 kg/m²      Physical Exam  Vitals and nursing note reviewed.   Constitutional:       Appearance: Normal appearance. She is normal weight.   HENT:      Head: Normocephalic and atraumatic.      Nose: Nose normal.      Mouth/Throat:      Mouth: Mucous membranes are moist.      Pharynx: Oropharynx is clear.   Eyes:      Extraocular Movements: Extraocular movements intact.      Pupils: Pupils are equal, round, and reactive to light.   Cardiovascular:      Rate and Rhythm: Normal rate and regular rhythm.   Pulmonary:      Effort: Pulmonary effort is normal.   Musculoskeletal:         General: Normal range of motion.      Cervical back: Normal range of motion.   Skin:     General: Skin is warm and dry.      Capillary Refill: Capillary refill takes less than 2 seconds.   Neurological:      General: No focal deficit present.      Mental Status: She is alert and oriented to person, place, " and time. Mental status is at baseline.   Psychiatric:         Mood and Affect: Mood normal.         Speech: Speech normal.         Thought Content: Thought content normal.         Judgment: Judgment normal.         Rt hand- Full AROM.   Strength 5/5.   Sensation intact.  No thenar wasting. normal capillary refill.            Anterior neck:  Puncture wounds are healed.  FROM neck in all planes.              Assessment/Plan:        1. Puncture wound of neck, subsequent encounter    2. Finger numbness    Advance work restrictions, trial full duty  RTC in 5 days, anticipate discharge MMI

## 2021-06-12 ENCOUNTER — OCCUPATIONAL MEDICINE (OUTPATIENT)
Dept: URGENT CARE | Facility: PHYSICIAN GROUP | Age: 26
End: 2021-06-12
Payer: COMMERCIAL

## 2021-06-12 VITALS
HEIGHT: 63 IN | SYSTOLIC BLOOD PRESSURE: 112 MMHG | RESPIRATION RATE: 16 BRPM | DIASTOLIC BLOOD PRESSURE: 60 MMHG | HEART RATE: 69 BPM | OXYGEN SATURATION: 96 % | BODY MASS INDEX: 22.68 KG/M2 | TEMPERATURE: 98.4 F | WEIGHT: 128 LBS

## 2021-06-12 DIAGNOSIS — S11.93XD: ICD-10-CM

## 2021-06-12 PROCEDURE — 99213 OFFICE O/P EST LOW 20 MIN: CPT | Performed by: NURSE PRACTITIONER

## 2021-06-12 NOTE — LETTER
[facility]  [address] - [place] [address]  Phone:  [phone] - Fax:  [phone]   [facility] Progress Report and Disability Certification  Date of Service: 6/12/2021   No Show:  No  Date / Time of Next Visit:     Claim Information   Patient Name: Shahana Gilmore  Claim Number:     Employer: [employer]  Date of Injury: 5/30/2021     Insurer / TPA: [employer]  ID / SSN:     Occupation: Builder  Diagnosis: The encounter diagnosis was Puncture wound of neck, subsequent encounter.    Medical Information   Related to Industrial Injury? Yes    Subjective Complaints:  DOI 5/28/21 (Patient initially noted injury as 5/30/21 but now notes it was on 5/28/21):  Patient had a puncture wound injury to right side of neck. She had secondary onset of right middle 3 finger intermittent tingling since the incident. She was initially seen and placed on a Medrol pack which improved her symptoms. She has completed the medication but has not taken any medication since. Her tingling sensation has improved and is only intermittent. Symptoms to the puncture site have completely resolved. She has been on full duty and tolerating well.   Objective Findings: A/Ox4. NAD. Right cervical region: No signs of puncture wound. Neck has full range of motion. Bilateral upper extremity strength 5/5, neurovascular is intact, strength 5/5.   Pre-Existing Condition(s): Denies    Assessment:   Condition Improved    Status: Discharged /  MMI  Permanent Disability:No    Plan: Medication  Comments:Discharge from occupational health, consider OTC NSAIDs, follow-up as needed.    Diagnostics:   Comments:N/A    Comments:       Disability Information   Status: Released to Full Duty    From:     Through:   Restrictions are:     Physical Restrictions   Sitting:    Standing:    Stooping:    Bending:      Squatting:    Walking:    Climbing:    Pushing:      Pulling:    Other:     Reaching Above Shoulder (L):   Reaching Above Shoulder (R):       Reaching Below Shoulder (L):    Reaching Below Shoulder (R):      Not to exceed Weight Limits   Carrying(hrs):   Weight Limit(lb):   Lifting(hrs):   Weight  Limit(lb):     Comments:      Repetitive Actions   Hands: i.e. Fine Manipulations from Grasping:     Feet: i.e. Operating Foot Controls:     Driving / Operate Machinery:     Provider Name:   KAILYN Kate Physician Signature:  Physician Name:     Clinic Name / Location: [address]  [place] [address] Clinic Phone Number: Dept: [phone]   Appointment Time: 10:00 Am Visit Start Time: 9:47 AM   Check-In Time:  9:44 Am Visit Discharge Time:  10:10AM   Original-Treating Physician or Chiropractor    Page 2-Insurer/TPA    Page 3-Employer    Page 4-Employee

## 2021-06-12 NOTE — PROGRESS NOTES
Chief Complaint   Patient presents with   • Work-Related Injury     fv        HISTORY OF PRESENT ILLNESS: Patient is a pleasant 25 y.o. female who presents to urgent care today with a work comp follow up. DOI 5/28/21 (Patient initially noted injury as 5/30/21 but now notes it was on 5/28/21):  Patient had a puncture wound injury to right side of neck. She had secondary onset of right middle 3 finger intermittent tingling since the incident. She was initially seen and placed on a Medrol pack which improved her symptoms. She has completed the medication but has not taken any medication since. Her tingling sensation has improved and is only intermittent. Symptoms to the puncture site have completely resolved. She has been on full duty and tolerating well.      PMH: No pertinent past medical history to this problem  MEDS: Medications were reviewed in Epic  ALLERGIES: Allergies were reviewed in Epic  FH: No pertinent family history to this problem      ROS:  Review of Systems   Constitutional: Negative for fever, chills, weight loss, malaise, and fatigue.   HENT: Negative for ear pain, nosebleeds, congestion, sore throat and neck pain.    Eyes: Negative for vision changes.   Neuro: Negative for headache, sensory changes, weakness, seizure, LOC.   Cardiovascular: Negative for chest pain, palpitations, orthopnea and leg swelling.   Respiratory: Negative for cough, sputum production, shortness of breath and wheezing.   Gastrointestinal: Negative for abdominal pain, nausea, vomiting or diarrhea.   Genitourinary: Negative for dysuria, urgency and frequency.  Musculoskeletal: Positive for intermittent tingling sensation to right middle 3 fingers. Negative for falls, neck pain, back pain, joint pain, myalgias.   Skin: Positive for recent puncture wound. Negative for rash, diaphoresis.     Exam:  /60 (BP Location: Left arm, Patient Position: Sitting, BP Cuff Size: Adult)   Pulse 69   Temp 36.9 °C (98.4 °F) (Temporal)   " Resp 16   Ht 1.6 m (5' 3\")   Wt 58.1 kg (128 lb)   SpO2 96%   General: well-nourished, well-developed female in NAD  Head: normocephalic, atraumatic  Eyes: PERRLA, no conjunctival injection, acuity grossly intact, lids normal.  Ears: normal shape and symmetry, no tenderness, no discharge. External canals are without any significant edema or erythema. Tympanic membranes are without any inflammation, no effusion. Gross auditory acuity is intact.  Nose: symmetrical without tenderness, no discharge.  Mouth/Throat: reasonable hygiene, no erythema, exudates or tonsillar enlargement.  Neck: no masses, range of motion within normal limits, no tracheal deviation. No obvious thyroid enlargement.   Lymph: no cervical adenopathy. No supraclavicular adenopathy.   Neuro: alert and oriented. Cranial nerves 1-12 grossly intact. No sensory deficit.   Cardiovascular: regular rate and rhythm. No edema.  Pulmonary: no distress. Chest is symmetrical with respiration, no wheezes, crackles, or rhonchi.   Musculoskeletal: no clubbing, appropriate muscle tone, gait is stable. Right cervical region: No signs of puncture wound. Neck has full range of motion. Bilateral upper extremity strength 5/5, neurovascular is intact, strength 5/5.  Skin: warm, dry, intact, no clubbing, no cyanosis, no rashes.   Psych: appropriate mood, affect, judgement.         Assessment/Plan:  1. Puncture wound of neck, subsequent encounter         Discharge from occupational health, patient is in agreement, consider OTC NSAIDs, follow-up as needed.  Supportive care, differential diagnoses, and indications for immediate follow-up discussed with patient.   Pathogenesis of diagnosis discussed including typical length and natural progression.   Instructed to return to clinic or nearest emergency department sooner for any change in condition, further concerns, or worsening of symptoms.  Patient states understanding of the plan of care and discharge " instructions.          Please note that this dictation was created using voice recognition software. I have made every reasonable attempt to correct obvious errors, but I expect that there are errors of grammar and possibly content that I did not discover before finalizing the note.      ANNY Kate.

## 2022-09-21 ENCOUNTER — HOSPITAL ENCOUNTER (OUTPATIENT)
Facility: MEDICAL CENTER | Age: 27
End: 2022-09-21
Attending: SPECIALIST
Payer: COMMERCIAL

## 2022-09-21 PROCEDURE — 88175 CYTOPATH C/V AUTO FLUID REDO: CPT

## 2022-09-23 LAB
AMBIGUOUS DTTM AMBI4: NORMAL
CYTOLOGY REG CYTOL: NORMAL

## 2022-10-10 ENCOUNTER — APPOINTMENT (OUTPATIENT)
Dept: RADIOLOGY | Facility: IMAGING CENTER | Age: 27
End: 2022-10-10
Attending: NURSE PRACTITIONER
Payer: COMMERCIAL

## 2022-10-10 ENCOUNTER — OCCUPATIONAL MEDICINE (OUTPATIENT)
Dept: URGENT CARE | Facility: PHYSICIAN GROUP | Age: 27
End: 2022-10-10
Payer: COMMERCIAL

## 2022-10-10 VITALS
SYSTOLIC BLOOD PRESSURE: 110 MMHG | DIASTOLIC BLOOD PRESSURE: 74 MMHG | HEART RATE: 69 BPM | BODY MASS INDEX: 24.8 KG/M2 | WEIGHT: 140 LBS | RESPIRATION RATE: 20 BRPM | TEMPERATURE: 97.5 F | HEIGHT: 63 IN | OXYGEN SATURATION: 95 %

## 2022-10-10 DIAGNOSIS — M79.672 LEFT FOOT PAIN: ICD-10-CM

## 2022-10-10 DIAGNOSIS — S20.229A CONTUSION OF BACK, UNSPECIFIED LATERALITY, INITIAL ENCOUNTER: ICD-10-CM
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
## 2022-10-10 DIAGNOSIS — Z02.1 PRE-EMPLOYMENT DRUG SCREENING: Primary | ICD-10-CM

## 2022-10-10 LAB
BREATH ALCOHOL COMMENT: NORMAL
POC BREATHALIZER: 0 PERCENT (ref 0–0.01)

## 2022-10-10 PROCEDURE — 99214 OFFICE O/P EST MOD 30 MIN: CPT | Performed by: NURSE PRACTITIONER

## 2022-10-10 PROCEDURE — 73630 X-RAY EXAM OF FOOT: CPT | Mod: TC,LT | Performed by: FAMILY MEDICINE

## 2022-10-10 PROCEDURE — 72100 X-RAY EXAM L-S SPINE 2/3 VWS: CPT | Mod: TC | Performed by: FAMILY MEDICINE

## 2022-10-10 PROCEDURE — 36415 COLL VENOUS BLD VENIPUNCTURE: CPT | Performed by: NURSE PRACTITIONER

## 2022-10-10 PROCEDURE — 71111 X-RAY EXAM RIBS/CHEST4/> VWS: CPT | Mod: TC | Performed by: FAMILY MEDICINE

## 2022-10-10 PROCEDURE — 99000 SPECIMEN HANDLING OFFICE-LAB: CPT | Performed by: NURSE PRACTITIONER

## 2022-10-10 PROCEDURE — 82075 ASSAY OF BREATH ETHANOL: CPT | Performed by: NURSE PRACTITIONER

## 2022-10-10 RX ORDER — IBUPROFEN 800 MG/1
800 TABLET ORAL EVERY 8 HOURS PRN
Qty: 30 TABLET | Refills: 0 | Status: SHIPPED | OUTPATIENT
Start: 2022-10-10 | End: 2023-04-03

## 2022-10-10 RX ORDER — CYCLOBENZAPRINE HCL 10 MG
10 TABLET ORAL EVERY 8 HOURS PRN
Qty: 30 TABLET | Refills: 0 | Status: SHIPPED | OUTPATIENT
Start: 2022-10-10 | End: 2023-04-03

## 2022-10-10 ASSESSMENT — ENCOUNTER SYMPTOMS
BACK PAIN: 1
CONSTITUTIONAL NEGATIVE: 1

## 2022-10-10 ASSESSMENT — VISUAL ACUITY: OU: 1

## 2022-10-10 NOTE — LETTER
"EMPLOYEE’S CLAIM FOR COMPENSATION/ REPORT OF INITIAL TREATMENT  FORM C-4    EMPLOYEE’S CLAIM - PROVIDE ALL INFORMATION REQUESTED   First Name  Shahana Last Name  Deirdre Birthdate                    1995                Sex  female Claim Number (Insurer’s Use Only)    Home Address  [address] Age  27 y.o. 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 (if applicable)  I was walking down stairs    How did this injury or occupational disease occur? (Be specific an answer in detail. Use additional sheet if necessary)  I was walking down the stairs when the stairs fall apart and I fall on top of it   If you believe that you have an occupational disease, when did you first have knowledge of the disability and it relationship to your employment?  N/A Witnesses to the Accident  N/A      Nature of Injury or Occupational Disease  Sprain  Part(s) of Body Injured or Affected  Foot (R), Lower Back Area (Lumbar Area & Lumbo-Sacral), Upper Back Area (Thoracic Area)    I certify that the above is true and correct to the best of my knowledge and " that I have provided this information in order to obtain the benefits of [place]  I hereby authorize any physician, chiropractor, surgeon, practitioner, or other person, any hospital, including [facility] or [facility] hospital, any medical service organization, any insurance company, or other institution or organization to release to each other, any medical or other information, including benefits paid or payable, pertinent to this injury or disease, except information relative to diagnosis, treatment and/or counseling for AIDS, psychological conditions, alcohol or controlled substances, for which I must give specific authorization.  A Photostat of this authorization shall be as valid as the original.     Date 10/10/2022   [facility] Employee’s Original or  *Electronic Signature   THIS REPORT MUST BE COMPLETED AND MAILED WITHIN 3 WORKING DAYS OF TREATMENT   Place  [facility]  Name of Facility  [facility]   Date  10/10/2022 Diagnosis and Description of Injury or Occupational Disease  (W19.XXXA) Fall, initial encounter  (S20.229A) Contusion of back, unspecified laterality, initial encounter  (M79.672) Left foot pain Is there evidence the injured employee was under the influence of alcohol and/or another controlled substance at the time of accident?  ? No ? Yes (if yes, please explain)    Hour  12:07 PM   Diagnoses of Fall, initial encounter, Contusion of back, unspecified laterality, initial encounter, and Left foot pain were pertinent to this visit. No   Treatment  Initial visit.  X-rays negative.  Rx sent for ibuprofen and muscle relaxant (not for use at work or while driving).  Advise rest, ice/heat.  Work restrictions.  Follow up in 3 days; sooner if needed.  Have you advised the patient to remain off work five days or     more?    X-Ray Findings  Negative   ? Yes  "Indicate dates:   From   To      From information given by the employee, together with medical evidence, can        you directly connect this injury or occupational disease as job incurred?  Yes ? No If no, is the injured employee capable of:  ? full duty  No ? modified duty  Yes   Is additional medical care by a physician indicated?  Yes If Modified Duty, Specify any Limitations / Restrictions  Per D39   Do you know of any previous injury or disease contributing to this condition or occupational disease?  ? Yes ? No (Explain if yes)                          No   Date  10/10/2022 Print Health Care Provider's   KAILYN Gross I certify the employer’s copy of  this form was mailed on:   Address  [address] [place] [address]. [address] Insurer’s Use Only     [facility] Zip  [address]    Provider’s Tax ID Number  [id number] Telephone  Dept: [phone]             Health Care Provider’s Original or Electronic Signature  e-MAGGIE Young.P.R.NRigo Degree (MD,DO, DC,PA-C,APRN)   APRN      * Complete and attach Release of Information (Form C-4A) when injured employee signs C-4 Form electronically  ORIGINAL - TREATING HEALTHCARE PROVIDER PAGE 2 - INSURER/TPA PAGE 3 - EMPLOYER PAGE 4 - EMPLOYEE             Form C-4 (rev.08/21)           BRIEF DESCRIPTION OF RIGHTS AND BENEFITS  (Pursuant to [place] 616C.050)    Notice of Injury or Occupational Disease (Incident Report Form C-1): If an injury or occupational disease (OD) arises out of and in the course of employment, you must provide written notice to your employer as soon as practicable, but no later than 7 days after the accident or OD. Your employer shall maintain a sufficient supply of the required forms.    Claim for Compensation (Form C-4): If medical treatment is sought, the form C-4 is available at the place of initial treatment. A completed \"Claim for Compensation\" (Form C-4) must be filed within 90 days after an accident or OD. The treating " physician or chiropractor must, within 3 working days after treatment, complete and mail to the employer, the employer's insurer and third-party , the Claim for Compensation.    Medical Treatment: If you require medical treatment for your on-the-job injury or OD, you may be required to select a physician or chiropractor from a list provided by your workers’ compensation insurer, if it has contracted with an Organization for Managed Care (MCO) or Preferred Provider Organization (PPO) or providers of health care. If your employer has not entered into a contract with an MCO or PPO, you may select a physician or chiropractor from the Panel of Physicians and Chiropractors. Any medical costs related to your industrial injury or OD will be paid by your insurer.    Temporary Total Disability (TTD): If your doctor has certified that you are unable to work for a period of at least 5 consecutive days, or 5 cumulative days in a 20-day period, or places restrictions on you that your employer does not accommodate, you may be entitled to TTD compensation.    Temporary Partial Disability (TPD): If the wage you receive upon reemployment is less than the compensation for TTD to which you are entitled, the insurer may be required to pay you TPD compensation to make up the difference. TPD can only be paid for a maximum of 24 months.    Permanent Partial Disability (PPD): When your medical condition is stable and there is an indication of a PPD as a result of your injury or OD, within 30 days, your insurer must arrange for an evaluation by a rating physician or chiropractor to determine the degree of your PPD. The amount of your PPD award depends on the date of injury, the results of the PPD evaluation, your age and wage.    Permanent Total Disability (PTD): If you are medically certified by a treating physician or chiropractor as permanently and totally disabled and have been granted a PTD status by your insurer, you are  entitled to receive monthly benefits not to exceed 66 2/3% of your average monthly wage. The amount of your PTD payments is subject to reduction if you previously received a lump-sum PPD award.    Vocational Rehabilitation Services: You may be eligible for vocational rehabilitation services if you are unable to return to the job due to a permanent physical impairment or permanent restrictions as a result of your injury or occupational disease.    Transportation and Per Shawn Reimbursement: You may be eligible for travel expenses and per shawn associated with medical treatment.    Reopening: You may be able to reopen your claim if your condition worsens after claim closure.     Appeal Process: If you disagree with a written determination issued by the insurer or the insurer does not respond to your request, you may appeal to the Department of Administration, , by following the instructions contained in your determination letter. You must appeal the determination within 70 days from the date of the determination letter at [address], [address], [place], [place] [address], or [address], [address], [place] [address]. If you disagree with the  decision, you may appeal to the Department of Administration, . You must file your appeal within 30 days from the date of the  decision letter at [address], [address], [place], [place] [address], or [address], [address], [place] [address]. If you disagree with a decision of an , you may file a petition for judicial review with the District Court. You must do so within 30 days of the Appeal Officer’s decision. You may be represented by an  at your own expense or you may contact the [facility] for possible representation.    [place]  for Injured Workers (NAIW): If you disagree with a  decision, you may request that NAIW represent  you without charge at an  Hearing. For information regarding denial of benefits, you may contact the [facility] at: [address], [address], [place] [address], [phone], or [address] [facility], [address], [place] [address], [phone]    To File a Complaint with the Division: If you wish to file a complaint with the  of the Division of Industrial Relations (DIR),  please contact the Workers’ Compensation Section, [address], [address], [place], [place] [address], telephone [phone]) [phone], or [address], [address], [place], [place] [address], telephone [phone].    For assistance with Workers’ Compensation Issues: You may contact the [facility] Office for Consumer Health Assistance, [address], [address], [place], [place] [address], Toll Free 1-980.398.2537, Web site: http://Community Health.[place].gov/Programs/COCO E-mail: coco@[email or web address]              __________________________________________________________________                                    _________________            Employee Name / Signature                                                                                                                            Date                                                                                                                                                                                                                              D-2 (rev. 10/20)

## 2022-10-10 NOTE — LETTER
[facility] [place]  [address] - [place] [address]  Phone:  [phone] - Fax:  [phone]   [facility] Progress Report and Disability Certification  Date of Service: 10/10/2022   No Show:  No  Date / Time of Next Visit: 10/13/2022   Claim Information   Patient Name: Shahana Gilmore  Claim Number:     Employer:   [employer] [employer] Date of Injury: 10/7/2022     Insurer / TPA: [employer]  ID / SSN:     Occupation: Carpinter  Diagnosis: Diagnoses of Fall, initial encounter, Contusion of back, unspecified laterality, initial encounter, and Left foot pain were pertinent to this visit.    Medical Information   Related to Industrial Injury? Yes    Subjective Complaints:  DOI: 10/7/2022 @ 12:00 PM. Initial visit.    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  Reports right middle back pain and tenderness which worsens with deep breathing.  Also has pain on the left side.  Also reports falling onto her lower middle back and has tenderness there.  Had history of left heel pain about 2 years ago which resolved.  However, it got worse after falling.  Denies second job.   Objective Findings: Constitutional:       General: She is not in acute distress.     Appearance: She is well-developed. She is not ill-appearing or toxic-appearing. Cardiovascular:      Rate and Rhythm: Normal rate.      Pulses: Normal pulses.      Heart sounds: Normal heart sounds.   Pulmonary:      Effort: Pulmonary effort is normal. No respiratory distress.      Breath sounds: Normal breath sounds.      Comments: Lung expansion equal bilaterally  Musculoskeletal:         General: No deformity.      Thoracic back: Tenderness (Right lateral) present. No swelling, deformity or lacerations. Normal range of motion.      Lumbar back: Tenderness (Lumbosacral region) present. No swelling or deformity. Normal  range of motion.      Left ankle: Normal.      Left foot: Normal range of motion. No swelling, deformity, laceration or tenderness.   Skin:     Coloration: Skin is not pale.      Findings: No bruising, erythema or rash.   Neurological:      Mental Status: She is alert and oriented to person, place, and time.      Motor: No weakness.    Pre-Existing Condition(s):     Assessment:   Initial Visit    Status: Additional Care Required  Permanent Disability:No    Plan:      Diagnostics: X-ray    Comments:  Initial visit.  X-rays negative.  Rx sent for ibuprofen and muscle relaxant (not for use at work or while driving).  Advise rest, ice/heat.  Work restrictions.  Follow up in 3 days; sooner if needed.    Disability Information   Status: Released to Restricted Duty    From:  10/10/2022  Through: 10/13/2022 Restrictions are: Temporary   Physical Restrictions   Sitting:    Standing:    Stooping:    Bending:      Squatting:    Walking:    Climbing:    Pushing:      Pulling:    Other:    Reaching Above Shoulder (L):   Reaching Above Shoulder (R):       Reaching Below Shoulder (L):    Reaching Below Shoulder (R):      Not to exceed Weight Limits   Carrying(hrs):   Weight Limit(lb): < or = to 10 pounds Lifting(hrs):   Weight  Limit(lb): < or = to 10 pounds   Comments:      Repetitive Actions   Hands: i.e. Fine Manipulations from Grasping:     Feet: i.e. Operating Foot Controls:     Driving / Operate Machinery:     Health Care Provider’s Original or Electronic Signature  KAILYN Gross Health Care Provider’s Original or Electronic Signature    Rakan Bailey MD         Clinic Name / Location: [address]  [place] [address] Clinic Phone Number: Dept: [phone]   Appointment Time: 10:45 Am Visit Start Time: 12:07 PM   Check-In Time:  11:20 Am Visit Discharge Time: 2:40 PM   Original-Treating Physician or Chiropractor    Page 2-Insurer/TPA    Page 3-Employer    Page  4-Employee

## 2022-10-13 ENCOUNTER — OCCUPATIONAL MEDICINE (OUTPATIENT)
Dept: URGENT CARE | Facility: PHYSICIAN GROUP | Age: 27
End: 2022-10-13
Payer: COMMERCIAL

## 2022-10-13 VITALS
WEIGHT: 152 LBS | BODY MASS INDEX: 26.93 KG/M2 | TEMPERATURE: 97.7 F | SYSTOLIC BLOOD PRESSURE: 108 MMHG | HEART RATE: 63 BPM | RESPIRATION RATE: 16 BRPM | DIASTOLIC BLOOD PRESSURE: 64 MMHG | HEIGHT: 63 IN | OXYGEN SATURATION: 100 %
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
## 2022-10-13 DIAGNOSIS — M79.672 LEFT FOOT PAIN: ICD-10-CM

## 2022-10-13 PROCEDURE — 99212 OFFICE O/P EST SF 10 MIN: CPT | Performed by: PHYSICIAN ASSISTANT

## 2022-10-13 NOTE — PROGRESS NOTES
"Subjective:     Shahana Gilmore is a 27 y.o. female who presents for Work-Related Injury (FV, DOI: 10/7/22, L foot pain, back is feeling better )         DOI: 10/7/2022 @ 12:00 PM.   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  Reports right middle back pain and tenderness which worsens with deep breathing.  Also has pain on the left side.  Also reports falling onto her lower middle back and has tenderness there.  Had history of left heel pain about 2 years ago which resolved.  However, it got worse after falling.  Denies second job.\"     Follow up today 10/13: Patient reports her back pain has completely resolved. She has not needed any Ibuprofen recently for the pain. She states her left heel pain has improved to 90% and almost back to normal. She occasionally feels discomfort from walking. She is not limping. She denies any numbness, tingling, weakness. She believes she is good to go back to work full duty. She would like to be discharged.     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   FH:   No pertinent family history to this problem       Objective:     /64 (BP Location: Left arm, Patient Position: Sitting, BP Cuff Size: Adult)   Pulse 63   Temp 36.5 °C (97.7 °F) (Temporal)   Resp 16   Ht 1.6 m (5' 3\")   Wt 68.9 kg (152 lb)   SpO2 100%   BMI 26.93 kg/m²       Physical Exam  Vitals reviewed.   Constitutional:       General: She is not in acute distress.     Appearance: Normal appearance. She is not ill-appearing or toxic-appearing.   Eyes:      Conjunctiva/sclera: Conjunctivae normal.      Pupils: Pupils are equal, round, and reactive to light.   Cardiovascular:      Rate and Rhythm: Normal rate.   Pulmonary:      Effort: Pulmonary effort is normal.   Musculoskeletal: "      Cervical back: Neck supple.      Left foot: Normal range of motion and normal capillary refill. No swelling, deformity, tenderness, bony tenderness or crepitus. Normal pulse.      Comments: Normal gait    Skin:     General: Skin is warm and dry.   Neurological:      General: No focal deficit present.      Mental Status: She is alert and oriented to person, place, and time.   Psychiatric:         Mood and Affect: Mood normal.         Behavior: Behavior normal.     Assessment/Plan:       1. Fall, subsequent encounter    2. Left foot pain    Released to Full Duty FROM   TO       Plan:   Discharge MMI     Differential diagnosis, natural history, supportive care, and indications for immediate follow-up discussed.

## 2022-10-13 NOTE — LETTER
"   [facility] [place] [facility]  [address] [place] [address]. [address] - [place] [address]  Phone:  [phone] - Fax:  [phone]   [facility] Progress Report and Disability Certification  Date of Service: 10/13/2022   No Show:  No  Date / Time of Next Visit:     Claim Information   Patient Name: Shahana Gilmore  Claim Number:     Employer:   [employer] Date of Injury: 10/7/2022     Insurer / TPA: [employer]  ID / SSN:     Occupation: Carpinter  Diagnosis: Diagnoses of Fall, subsequent encounter and Left foot pain were pertinent to this visit.    Medical Information   Related to Industrial Injury? Yes    Subjective Complaints:     DOI: 10/7/2022 @ 12:00 PM.   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  Reports right middle back pain and tenderness which worsens with deep breathing.  Also has pain on the left side.  Also reports falling onto her lower middle back and has tenderness there.  Had history of left heel pain about 2 years ago which resolved.  However, it got worse after falling.  Denies second job.\"     Follow up today 10/13: Patient reports her back pain has completely resolved. She has not needed any Ibuprofen recently for the pain. She states her left heel pain has improved to 90% and almost back to normal. She occasionally feels discomfort from walking. She is not limping. She denies any numbness, tingling, weakness. She believes she is good to go back to work full duty. She would like to be discharged.    Objective Findings:   Physical Exam  Vitals reviewed.   Constitutional:       General: She is not in acute distress.     Appearance: Normal appearance. She is not ill-appearing or toxic-appearing.   Eyes:      Conjunctiva/sclera: Conjunctivae normal.      Pupils: Pupils are equal, round, and reactive to light.   Cardiovascular:      Rate and " Rhythm: Normal rate.   Pulmonary:      Effort: Pulmonary effort is normal.   Musculoskeletal:      Cervical back: Neck supple.      Left foot: Normal range of motion and normal capillary refill. No swelling, deformity, tenderness, bony tenderness or crepitus. Normal pulse.      Comments: Normal gait    Skin:     General: Skin is warm and dry.   Neurological:      General: No focal deficit present.      Mental Status: She is alert and oriented to person, place, and time.   Psychiatric:         Mood and Affect: Mood normal.         Behavior: Behavior normal.    Pre-Existing Condition(s):     Assessment:   Condition Improved    Status: Discharged /  MMI  Permanent Disability:No    Plan:      Diagnostics:      Comments:  Plan:   Discharge MMI     Disability Information   Status: Released to Full Duty    From:     Through:   Restrictions are:     Physical Restrictions   Sitting:    Standing:    Stooping:    Bending:      Squatting:    Walking:    Climbing:    Pushing:      Pulling:    Other:    Reaching Above Shoulder (L):   Reaching Above Shoulder (R):       Reaching Below Shoulder (L):    Reaching Below Shoulder (R):      Not to exceed Weight Limits   Carrying(hrs):   Weight Limit(lb):   Lifting(hrs):   Weight  Limit(lb):     Comments:      Repetitive Actions   Hands: i.e. Fine Manipulations from Grasping:     Feet: i.e. Operating Foot Controls:     Driving / Operate Machinery:     Health Care Provider’s Original or Electronic Signature  Mejia Herrera P.A.-C. Health Care Provider’s Original or Electronic Signature    Rakan Bailey MD         Clinic Name / Location: [address]  [place] [address] Clinic Phone Number: Dept: [phone]   Appointment Time: 10:00 Am Visit Start Time: 10:41 AM   Check-In Time:  10:00 Am Visit Discharge Time: 11:10 am   Original-Treating Physician or Chiropractor    Page 2-Insurer/TPA    Page 3-Employer    Page 4-Employee

## 2022-12-16 ENCOUNTER — TELEPHONE (OUTPATIENT)
Dept: HEALTH INFORMATION MANAGEMENT | Facility: OTHER | Age: 27
End: 2022-12-16
Payer: COMMERCIAL

## 2023-01-31 ENCOUNTER — APPOINTMENT (OUTPATIENT)
Dept: OBGYN | Facility: CLINIC | Age: 28
End: 2023-01-31
Payer: COMMERCIAL

## 2023-03-28 ENCOUNTER — APPOINTMENT (OUTPATIENT)
Dept: RADIOLOGY | Facility: IMAGING CENTER | Age: 28
End: 2023-03-28
Attending: NURSE PRACTITIONER
Payer: COMMERCIAL

## 2023-03-28 ENCOUNTER — HOSPITAL ENCOUNTER (OUTPATIENT)
Dept: LAB | Facility: MEDICAL CENTER | Age: 28
End: 2023-03-28
Attending: NURSE PRACTITIONER
Payer: COMMERCIAL

## 2023-03-28 ENCOUNTER — OFFICE VISIT (OUTPATIENT)
Dept: MEDICAL GROUP | Facility: PHYSICIAN GROUP | Age: 28
End: 2023-03-28
Payer: COMMERCIAL

## 2023-03-28 VITALS
HEIGHT: 63 IN | TEMPERATURE: 97.3 F | SYSTOLIC BLOOD PRESSURE: 100 MMHG | HEART RATE: 66 BPM | BODY MASS INDEX: 27.48 KG/M2 | WEIGHT: 155.1 LBS | OXYGEN SATURATION: 98 % | DIASTOLIC BLOOD PRESSURE: 68 MMHG

## 2023-03-28 DIAGNOSIS — E55.9 VITAMIN D DEFICIENCY: ICD-10-CM

## 2023-03-28 DIAGNOSIS — G44.89 OTHER HEADACHE SYNDROME: ICD-10-CM

## 2023-03-28 DIAGNOSIS — Z23 NEED FOR VACCINATION: ICD-10-CM
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
## 2023-03-28 LAB
25(OH)D3 SERPL-MCNC: 17 NG/ML (ref 30–100)
ALBUMIN SERPL BCP-MCNC: 4.5 G/DL (ref 3.2–4.9)
ALBUMIN/GLOB SERPL: 1.3 G/DL
ALP SERPL-CCNC: 61 U/L (ref 30–99)
ALT SERPL-CCNC: 22 U/L (ref 2–50)
ANION GAP SERPL CALC-SCNC: 10 MMOL/L (ref 7–16)
AST SERPL-CCNC: 20 U/L (ref 12–45)
BILIRUB SERPL-MCNC: 0.5 MG/DL (ref 0.1–1.5)
BUN SERPL-MCNC: 15 MG/DL (ref 8–22)
CALCIUM ALBUM COR SERPL-MCNC: 9.3 MG/DL (ref 8.5–10.5)
CALCIUM SERPL-MCNC: 9.7 MG/DL (ref 8.5–10.5)
CHLORIDE SERPL-SCNC: 102 MMOL/L (ref 96–112)
CO2 SERPL-SCNC: 24 MMOL/L (ref 20–33)
CREAT SERPL-MCNC: 0.65 MG/DL (ref 0.5–1.4)
ERYTHROCYTE [DISTWIDTH] IN BLOOD BY AUTOMATED COUNT: 46 FL (ref 35.9–50)
GFR SERPLBLD CREATININE-BSD FMLA CKD-EPI: 122 ML/MIN/1.73 M 2
GLOBULIN SER CALC-MCNC: 3.4 G/DL (ref 1.9–3.5)
GLUCOSE SERPL-MCNC: 86 MG/DL (ref 65–99)
HCT VFR BLD AUTO: 45.7 % (ref 37–47)
HGB BLD-MCNC: 14.4 G/DL (ref 12–16)
MCH RBC QN AUTO: 29.7 PG (ref 27–33)
MCHC RBC AUTO-ENTMCNC: 31.5 G/DL (ref 33.6–35)
MCV RBC AUTO: 94.2 FL (ref 81.4–97.8)
PLATELET # BLD AUTO: 296 K/UL (ref 164–446)
PMV BLD AUTO: 10.3 FL (ref 9–12.9)
POTASSIUM SERPL-SCNC: 4.3 MMOL/L (ref 3.6–5.5)
PROT SERPL-MCNC: 7.9 G/DL (ref 6–8.2)
RBC # BLD AUTO: 4.85 M/UL (ref 4.2–5.4)
SODIUM SERPL-SCNC: 136 MMOL/L (ref 135–145)
T4 FREE SERPL-MCNC: 0.66 NG/DL (ref 0.93–1.7)
TSH SERPL DL<=0.005 MIU/L-ACNC: 8.01 UIU/ML (ref 0.38–5.33)
WBC # BLD AUTO: 8.4 K/UL (ref 4.8–10.8)

## 2023-03-28 PROCEDURE — 72050 X-RAY EXAM NECK SPINE 4/5VWS: CPT | Mod: TC | Performed by: RADIOLOGY

## 2023-03-28 PROCEDURE — 80053 COMPREHEN METABOLIC PANEL: CPT

## 2023-03-28 PROCEDURE — 85027 COMPLETE CBC AUTOMATED: CPT

## 2023-03-28 PROCEDURE — 84443 ASSAY THYROID STIM HORMONE: CPT

## 2023-03-28 PROCEDURE — 84439 ASSAY OF FREE THYROXINE: CPT

## 2023-03-28 PROCEDURE — 90471 IMMUNIZATION ADMIN: CPT | Performed by: NURSE PRACTITIONER

## 2023-03-28 PROCEDURE — 82306 VITAMIN D 25 HYDROXY: CPT

## 2023-03-28 PROCEDURE — 90715 TDAP VACCINE 7 YRS/> IM: CPT | Performed by: NURSE PRACTITIONER

## 2023-03-28 PROCEDURE — 99214 OFFICE O/P EST MOD 30 MIN: CPT | Mod: 25 | Performed by: NURSE PRACTITIONER

## 2023-03-28 PROCEDURE — 36415 COLL VENOUS BLD VENIPUNCTURE: CPT

## 2023-03-28 RX ORDER — NAPROXEN 500 MG/1
500 TABLET ORAL 2 TIMES DAILY WITH MEALS
Qty: 60 TABLET | Refills: 2 | Status: SHIPPED | OUTPATIENT
Start: 2023-03-28 | End: 2023-04-03
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
## 2023-04-03 ENCOUNTER — OFFICE VISIT (OUTPATIENT)
Dept: MEDICAL GROUP | Facility: PHYSICIAN GROUP | Age: 28
End: 2023-04-03
Payer: COMMERCIAL

## 2023-04-03 VITALS
HEART RATE: 84 BPM | OXYGEN SATURATION: 96 % | WEIGHT: 149.4 LBS | DIASTOLIC BLOOD PRESSURE: 58 MMHG | BODY MASS INDEX: 26.47 KG/M2 | TEMPERATURE: 98 F | SYSTOLIC BLOOD PRESSURE: 94 MMHG | HEIGHT: 63 IN

## 2023-04-03 DIAGNOSIS — E03.8 OTHER SPECIFIED HYPOTHYROIDISM: ICD-10-CM

## 2023-04-03 DIAGNOSIS — G44.89 OTHER HEADACHE SYNDROME: ICD-10-CM

## 2023-04-03 DIAGNOSIS — E04.9 GOITER: ICD-10-CM

## 2023-04-03 PROCEDURE — 99214 OFFICE O/P EST MOD 30 MIN: CPT | Performed by: NURSE PRACTITIONER

## 2023-04-03 RX ORDER — LEVOTHYROXINE SODIUM 0.1 MG/1
100 TABLET ORAL
Qty: 90 TABLET | Refills: 1 | Status: SHIPPED | OUTPATIENT
Start: 2023-04-03 | End: 2023-05-30 | Stop reason: SDUPTHER

## 2023-04-03 ASSESSMENT — PATIENT HEALTH QUESTIONNAIRE - PHQ9: CLINICAL INTERPRETATION OF PHQ2 SCORE: 0

## 2023-04-03 ASSESSMENT — FIBROSIS 4 INDEX: FIB4 SCORE: 0.4

## 2023-04-03 NOTE — PROGRESS NOTES
"  Chief Complaint   Patient presents with    Lab Results                                                                                                                                       HPI:   Shahana presents today with the following.    Problem   Other Specified Hypothyroidism   Other Headache Syndrome       Current Outpatient Medications   Medication Sig Dispense Refill    levothyroxine (SYNTHROID) 100 MCG Tab Take 1 Tablet by mouth every morning on an empty stomach. 90 Tablet 1    Cholecalciferol 73743 UNIT Cap Take 1 Capsule by mouth every 7 days. 12 Capsule 0     No current facility-administered medications for this visit.       Allergies as of 04/03/2023    (No Known Allergies)        ROS:  All systems negative expect as addressed in assessment and plan.     BP 94/58 (BP Location: Left arm, Patient Position: Sitting, BP Cuff Size: Adult)   Pulse 84   Temp 36.7 °C (98 °F) (Temporal)   Ht 1.6 m (5' 3\")   Wt 67.8 kg (149 lb 6.4 oz)   SpO2 96%   BMI 26.47 kg/m²       Physical Exam  Vitals reviewed.   Constitutional:       Appearance: Normal appearance.   HENT:      Head: Normocephalic and atraumatic.      Mouth/Throat:      Mouth: Mucous membranes are moist.   Eyes:      Extraocular Movements: Extraocular movements intact.      Conjunctiva/sclera: Conjunctivae normal.   Neck:      Thyroid: Thyroid mass and thyromegaly present.   Pulmonary:      Effort: Pulmonary effort is normal.   Musculoskeletal:         General: Normal range of motion.      Cervical back: Normal range of motion.   Skin:     General: Skin is warm and dry.   Neurological:      General: No focal deficit present.      Mental Status: She is alert and oriented to person, place, and time.   Psychiatric:         Mood and Affect: Mood normal.         Behavior: Behavior normal.         Thought Content: Thought content normal.         Assessment and Plan:  27 y.o. female with the following issues.    1. Other specified hypothyroidism  TSH    " FREE THYROXINE    TRIIDOTHYRONINE    THYROID PEROXIDASE  (TPO) AB    US-THYROID      2. Goiter  US-THYROID      3. Other headache syndrome             Other specified hypothyroidism  This is a new diagnosis. Patients most recent labs show an elevated TSH and low T4. She endorses weight gain, fatigue, and constipation. She denies cold intolerance, hair loss or dry skin.     Will start patient on weight based dosing at 100 mcg of levothyroxine per day.     Patient advised to monitor for symptoms of hyperthyroidism.     Will have patient get labs done in 6-8 weeks.         Other headache syndrome  This is a chronic condition. Patient reports that her headaches have not changed.     Xray results reviewed with patient.     Discussed that her headaches may improve with improvement in her thyroid levels.     If patient does not have improvement in 8 weeks, will consider PT referral and/or MRI.       Return in about 8 weeks (around 5/29/2023).      I have placed the below orders and discussed them with an approved delegating provider.  The MA is performing the below orders under the direction of Dr. Medina.    Please note that this dictation was created using voice recognition software. I have worked with consultants from the vendor as well as technical experts from [employer] to optimize the interface. I have made every reasonable attempt to correct obvious errors, but I expect that there are errors of grammar and possibly content that I did not discover before finalizing the note.

## 2023-04-03 NOTE — ASSESSMENT & PLAN NOTE
This is a new diagnosis. Patients most recent labs show an elevated TSH and low T4. She endorses weight gain, fatigue, and constipation. She denies cold intolerance, hair loss or dry skin.     Will start patient on weight based dosing at 100 mcg of levothyroxine per day.     Patient advised to monitor for symptoms of hyperthyroidism.     Will have patient get labs done in 6-8 weeks.

## 2023-04-03 NOTE — ASSESSMENT & PLAN NOTE
This is a chronic condition. Patient reports that her headaches have not changed.     Xray results reviewed with patient.     Discussed that her headaches may improve with improvement in her thyroid levels.     If patient does not have improvement in 8 weeks, will consider PT referral and/or MRI.

## 2023-05-18 ENCOUNTER — HOSPITAL ENCOUNTER (OUTPATIENT)
Dept: LAB | Facility: MEDICAL CENTER | Age: 28
End: 2023-05-18
Attending: NURSE PRACTITIONER
Payer: COMMERCIAL

## 2023-05-18 DIAGNOSIS — E03.8 OTHER SPECIFIED HYPOTHYROIDISM: ICD-10-CM

## 2023-05-18 LAB
T3 SERPL-MCNC: 120 NG/DL (ref 60–181)
T4 FREE SERPL-MCNC: 1.36 NG/DL (ref 0.93–1.7)
THYROPEROXIDASE AB SERPL-ACNC: 296 IU/ML (ref 0–9)
TSH SERPL DL<=0.005 MIU/L-ACNC: 0.81 UIU/ML (ref 0.38–5.33)

## 2023-05-18 PROCEDURE — 84443 ASSAY THYROID STIM HORMONE: CPT

## 2023-05-18 PROCEDURE — 36415 COLL VENOUS BLD VENIPUNCTURE: CPT

## 2023-05-18 PROCEDURE — 86376 MICROSOMAL ANTIBODY EACH: CPT

## 2023-05-18 PROCEDURE — 84439 ASSAY OF FREE THYROXINE: CPT

## 2023-05-18 PROCEDURE — 84480 ASSAY TRIIODOTHYRONINE (T3): CPT

## 2023-05-26 ENCOUNTER — APPOINTMENT (OUTPATIENT)
Dept: RADIOLOGY | Facility: MEDICAL CENTER | Age: 28
End: 2023-05-26
Attending: NURSE PRACTITIONER
Payer: COMMERCIAL

## 2023-05-26 DIAGNOSIS — E04.9 GOITER: ICD-10-CM

## 2023-05-26 DIAGNOSIS — E03.8 OTHER SPECIFIED HYPOTHYROIDISM: ICD-10-CM

## 2023-05-26 PROCEDURE — 76536 US EXAM OF HEAD AND NECK: CPT

## 2023-05-30 ENCOUNTER — OFFICE VISIT (OUTPATIENT)
Dept: MEDICAL GROUP | Facility: PHYSICIAN GROUP | Age: 28
End: 2023-05-30
Payer: COMMERCIAL

## 2023-05-30 VITALS
RESPIRATION RATE: 16 BRPM | WEIGHT: 161.3 LBS | HEART RATE: 76 BPM | DIASTOLIC BLOOD PRESSURE: 62 MMHG | TEMPERATURE: 97.9 F | OXYGEN SATURATION: 98 % | BODY MASS INDEX: 28.58 KG/M2 | HEIGHT: 63 IN | SYSTOLIC BLOOD PRESSURE: 106 MMHG

## 2023-05-30 DIAGNOSIS — G44.89 OTHER HEADACHE SYNDROME: ICD-10-CM

## 2023-05-30 DIAGNOSIS — E06.3 HYPOTHYROIDISM DUE TO HASHIMOTO'S THYROIDITIS: ICD-10-CM

## 2023-05-30 DIAGNOSIS — E04.2 MULTIPLE THYROID NODULES: ICD-10-CM

## 2023-05-30 DIAGNOSIS — E03.8 HYPOTHYROIDISM DUE TO HASHIMOTO'S THYROIDITIS: ICD-10-CM

## 2023-05-30 PROCEDURE — 3074F SYST BP LT 130 MM HG: CPT | Performed by: NURSE PRACTITIONER

## 2023-05-30 PROCEDURE — 99214 OFFICE O/P EST MOD 30 MIN: CPT | Performed by: NURSE PRACTITIONER

## 2023-05-30 PROCEDURE — 3078F DIAST BP <80 MM HG: CPT | Performed by: NURSE PRACTITIONER

## 2023-05-30 RX ORDER — LEVOTHYROXINE SODIUM 0.1 MG/1
100 TABLET ORAL
Qty: 90 TABLET | Refills: 3 | Status: SHIPPED | OUTPATIENT
Start: 2023-05-30

## 2023-05-30 ASSESSMENT — FIBROSIS 4 INDEX: FIB4 SCORE: 0.4

## 2023-05-30 NOTE — PROGRESS NOTES
"  Chief Complaint   Patient presents with    Lab Results     And ultrasound                                                                                                                                        HPI:   Shahana presents today with the following.    Problem   Multiple Thyroid Nodules   Hypothyroidism Due to Hashimoto's Thyroiditis   Other Headache Syndrome       Current Outpatient Medications   Medication Sig Dispense Refill    levothyroxine (SYNTHROID) 100 MCG Tab Take 1 Tablet by mouth every morning on an empty stomach. 90 Tablet 3    Cholecalciferol 95763 UNIT Cap Take 1 Capsule by mouth every 7 days. 12 Capsule 0     No current facility-administered medications for this visit.       Allergies as of 05/30/2023    (No Known Allergies)        ROS:  All systems negative expect as addressed in assessment and plan.     /62 (BP Location: Left arm, Patient Position: Sitting, BP Cuff Size: Adult)   Pulse 76   Temp 36.6 °C (97.9 °F) (Temporal)   Resp 16   Ht 1.6 m (5' 3\")   Wt 73.2 kg (161 lb 4.8 oz)   SpO2 98%   BMI 28.57 kg/m²       Physical Exam  Vitals reviewed.   Constitutional:       Appearance: Normal appearance.   HENT:      Head: Normocephalic and atraumatic.      Mouth/Throat:      Mouth: Mucous membranes are moist.   Eyes:      Extraocular Movements: Extraocular movements intact.      Conjunctiva/sclera: Conjunctivae normal.   Pulmonary:      Effort: Pulmonary effort is normal.   Musculoskeletal:         General: Normal range of motion.      Cervical back: Normal range of motion.   Skin:     General: Skin is warm and dry.   Neurological:      General: No focal deficit present.      Mental Status: She is alert and oriented to person, place, and time.   Psychiatric:         Mood and Affect: Mood normal.         Behavior: Behavior normal.         Thought Content: Thought content normal.           Assessment and Plan:  27 y.o. female with the following issues.    1. Other headache syndrome  "       2. Hypothyroidism due to Hashimoto's thyroiditis  FREE THYROXINE    TRIIDOTHYRONINE    TSH    US-THYROID      3. Multiple thyroid nodules  US-THYROID           Other headache syndrome  Chronic stable. Patient reports that her headaches have significantly improved with supplementing thyroid medication.         Hypothyroidism due to Hashimoto's thyroiditis  New chronic stable. Patient was started on levothyroxine 100mcg daily. She reports feeling better and that her headaches have resolved. She did report some palpitations when first starting the medication however these have resolved.     Imaging of thyroid reviewed.     Patient to continue levothyroxine 100mcg. Will recheck labs in 3 months.     Multiple thyroid nodules  Patient with multiple small solid nodules. Discussed results of imaging.     Will repeat thyroid imaging in 6 months.       Return in about 6 months (around 11/30/2023) for follow up for thyroid.      Please note that this dictation was created using voice recognition software. I have worked with consultants from the vendor as well as technical experts from [facility] to optimize the interface. I have made every reasonable attempt to correct obvious errors, but I expect that there are errors of grammar and possibly content that I did not discover before finalizing the note.

## 2023-05-30 NOTE — ASSESSMENT & PLAN NOTE
Chronic stable. Patient reports that her headaches have significantly improved with supplementing thyroid medication.

## 2023-05-30 NOTE — ASSESSMENT & PLAN NOTE
Patient with multiple small solid nodules. Discussed results of imaging.     Will repeat thyroid imaging in 6 months.

## 2023-05-30 NOTE — ASSESSMENT & PLAN NOTE
New chronic stable. Patient was started on levothyroxine 100mcg daily. She reports feeling better and that her headaches have resolved. She did report some palpitations when first starting the medication however these have resolved.     Imaging of thyroid reviewed.     Patient to continue levothyroxine 100mcg. Will recheck labs in 3 months.

## 2024-08-18 ENCOUNTER — HOSPITAL ENCOUNTER (EMERGENCY)
Facility: MEDICAL CENTER | Age: 29
End: 2024-08-18
Attending: EMERGENCY MEDICINE

## 2024-08-18 ENCOUNTER — APPOINTMENT (OUTPATIENT)
Dept: RADIOLOGY | Facility: MEDICAL CENTER | Age: 29
End: 2024-08-18
Attending: EMERGENCY MEDICINE

## 2024-08-18 VITALS
OXYGEN SATURATION: 96 % | DIASTOLIC BLOOD PRESSURE: 50 MMHG | BODY MASS INDEX: 28.53 KG/M2 | HEIGHT: 63 IN | WEIGHT: 161 LBS | SYSTOLIC BLOOD PRESSURE: 92 MMHG | TEMPERATURE: 97.8 F | HEART RATE: 69 BPM | RESPIRATION RATE: 14 BRPM

## 2024-08-18 DIAGNOSIS — S16.1XXA STRAIN OF NECK MUSCLE, INITIAL ENCOUNTER: ICD-10-CM
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
## 2024-08-18 DIAGNOSIS — S09.90XA CLOSED HEAD INJURY, INITIAL ENCOUNTER: ICD-10-CM

## 2024-08-18 PROCEDURE — 72125 CT NECK SPINE W/O DYE: CPT

## 2024-08-18 PROCEDURE — 70450 CT HEAD/BRAIN W/O DYE: CPT

## 2024-08-18 PROCEDURE — 99284 EMERGENCY DEPT VISIT MOD MDM: CPT

## 2024-08-18 PROCEDURE — 305948 HCHG GREEN TRAUMA ACT PRE-NOTIFY NO CC

## 2024-08-18 ASSESSMENT — FIBROSIS 4 INDEX: FIB4 SCORE: 0.42
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
## 2024-09-30 ENCOUNTER — APPOINTMENT (OUTPATIENT)
Dept: MEDICAL GROUP | Facility: PHYSICIAN GROUP | Age: 29
End: 2024-09-30

## 2024-09-30 VITALS
HEIGHT: 63 IN | WEIGHT: 158 LBS | RESPIRATION RATE: 16 BRPM | BODY MASS INDEX: 28 KG/M2 | SYSTOLIC BLOOD PRESSURE: 102 MMHG | DIASTOLIC BLOOD PRESSURE: 60 MMHG | TEMPERATURE: 98 F | HEART RATE: 95 BPM | OXYGEN SATURATION: 98 %

## 2024-09-30 DIAGNOSIS — S13.4XXD WHIPLASH INJURY TO NECK, SUBSEQUENT ENCOUNTER: ICD-10-CM

## 2024-09-30 DIAGNOSIS — E03.8 HYPOTHYROIDISM DUE TO HASHIMOTO'S THYROIDITIS: ICD-10-CM

## 2024-09-30 DIAGNOSIS — E06.3 HYPOTHYROIDISM DUE TO HASHIMOTO'S THYROIDITIS: ICD-10-CM

## 2024-09-30 DIAGNOSIS — E04.2 MULTIPLE THYROID NODULES: ICD-10-CM

## 2024-09-30 DIAGNOSIS — G44.89 OTHER HEADACHE SYNDROME: ICD-10-CM

## 2024-09-30 RX ORDER — LEVOTHYROXINE SODIUM 100 UG/1
100 TABLET ORAL
Qty: 90 TABLET | Refills: 1 | Status: SHIPPED | OUTPATIENT
Start: 2024-09-30

## 2024-09-30 RX ORDER — CYCLOBENZAPRINE HCL 10 MG
10 TABLET ORAL NIGHTLY PRN
Qty: 90 TABLET | Refills: 1 | Status: SHIPPED | OUTPATIENT
Start: 2024-09-30

## 2024-09-30 RX ORDER — METHOCARBAMOL 500 MG/1
500-1000 TABLET, FILM COATED ORAL 3 TIMES DAILY PRN
Qty: 120 TABLET | Refills: 1 | Status: SHIPPED | OUTPATIENT
Start: 2024-09-30

## 2024-09-30 ASSESSMENT — FIBROSIS 4 INDEX: FIB4 SCORE: 0.43

## 2024-09-30 ASSESSMENT — PATIENT HEALTH QUESTIONNAIRE - PHQ9: CLINICAL INTERPRETATION OF PHQ2 SCORE: 0
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
## 2025-03-22 DIAGNOSIS — E06.3 HYPOTHYROIDISM DUE TO HASHIMOTO'S THYROIDITIS: ICD-10-CM

## 2025-03-22 DIAGNOSIS — E04.2 MULTIPLE THYROID NODULES: ICD-10-CM

## 2025-03-24 RX ORDER — LEVOTHYROXINE SODIUM 100 UG/1
100 TABLET ORAL
Qty: 90 TABLET | Refills: 1 | Status: SHIPPED | OUTPATIENT
Start: 2025-03-24

## 2025-03-24 NOTE — TELEPHONE ENCOUNTER
Received request via: Patient    Was the patient seen in the last year in this department? Yes    Does the patient have an active prescription (recently filled or refills available) for medication(s) requested? No    Pharmacy Name: [facility]    Does the patient have [facility] and need 100-day supply? (This applies to ALL medications) Patient does not have SCP

## 2025-04-25 DIAGNOSIS — G44.89 OTHER HEADACHE SYNDROME: ICD-10-CM

## 2025-04-25 DIAGNOSIS — S13.4XXD WHIPLASH INJURY TO NECK, SUBSEQUENT ENCOUNTER: ICD-10-CM

## 2025-04-28 RX ORDER — METHOCARBAMOL 500 MG/1
500-1000 TABLET, FILM COATED ORAL 3 TIMES DAILY PRN
Qty: 120 TABLET | Refills: 0 | Status: SHIPPED | OUTPATIENT
Start: 2025-04-28